# Patient Record
Sex: FEMALE | Race: BLACK OR AFRICAN AMERICAN | Employment: FULL TIME | URBAN - METROPOLITAN AREA
[De-identification: names, ages, dates, MRNs, and addresses within clinical notes are randomized per-mention and may not be internally consistent; named-entity substitution may affect disease eponyms.]

---

## 2017-01-16 ENCOUNTER — APPOINTMENT (OUTPATIENT)
Dept: GENERAL RADIOLOGY | Age: 37
End: 2017-01-16
Payer: MEDICAID

## 2017-01-16 ENCOUNTER — HOSPITAL ENCOUNTER (EMERGENCY)
Age: 37
Discharge: HOME | End: 2017-01-17
Attending: EMERGENCY MEDICINE
Payer: MEDICAID

## 2017-01-16 DIAGNOSIS — Z72.0 TOBACCO ABUSE: ICD-10-CM

## 2017-01-16 DIAGNOSIS — F12.10 MARIJUANA ABUSE: ICD-10-CM

## 2017-01-16 DIAGNOSIS — I10 ESSENTIAL HYPERTENSION: Primary | ICD-10-CM

## 2017-01-16 DIAGNOSIS — R42 LIGHTHEADEDNESS: ICD-10-CM

## 2017-01-16 LAB
ALBUMIN SERPL BCP-MCNC: 3.3 G/DL (ref 3.5–5)
ALBUMIN/GLOB SERPL: 0.8 {RATIO} (ref 1.2–3.5)
ALP SERPL-CCNC: 46 U/L (ref 50–136)
ALT SERPL-CCNC: 21 U/L (ref 12–65)
AMPHET UR QL SCN: NEGATIVE
ANION GAP BLD CALC-SCNC: 9 MMOL/L (ref 7–16)
AST SERPL W P-5'-P-CCNC: 48 U/L (ref 15–37)
BARBITURATES UR QL SCN: NEGATIVE
BASOPHILS # BLD AUTO: 0 K/UL (ref 0–0.2)
BASOPHILS # BLD: 0 % (ref 0–2)
BENZODIAZ UR QL: NEGATIVE
BILIRUB SERPL-MCNC: 0.3 MG/DL (ref 0.2–1.1)
BUN SERPL-MCNC: 11 MG/DL (ref 6–23)
CALCIUM SERPL-MCNC: 8.5 MG/DL (ref 8.3–10.4)
CANNABINOIDS UR QL SCN: POSITIVE
CHLORIDE SERPL-SCNC: 107 MMOL/L (ref 98–107)
CO2 SERPL-SCNC: 25 MMOL/L (ref 21–32)
COCAINE UR QL SCN: NEGATIVE
CREAT SERPL-MCNC: 0.88 MG/DL (ref 0.6–1)
DIFFERENTIAL METHOD BLD: NORMAL
EOSINOPHIL # BLD: 0.3 K/UL (ref 0–0.8)
EOSINOPHIL NFR BLD: 3 % (ref 0.5–7.8)
ERYTHROCYTE [DISTWIDTH] IN BLOOD BY AUTOMATED COUNT: 14 % (ref 11.9–14.6)
FLUAV AG NPH QL IA: NEGATIVE
FLUBV AG NPH QL IA: NEGATIVE
GLOBULIN SER CALC-MCNC: 3.9 G/DL (ref 2.3–3.5)
GLUCOSE SERPL-MCNC: 98 MG/DL (ref 65–100)
HCG UR QL: NEGATIVE
HCT VFR BLD AUTO: 39.5 % (ref 35.8–46.3)
HGB BLD-MCNC: 13 G/DL (ref 11.7–15.4)
IMM GRANULOCYTES # BLD: 0 K/UL (ref 0–0.5)
IMM GRANULOCYTES NFR BLD AUTO: 0.3 % (ref 0–5)
LYMPHOCYTES # BLD AUTO: 31 % (ref 13–44)
LYMPHOCYTES # BLD: 2.5 K/UL (ref 0.5–4.6)
MAGNESIUM SERPL-MCNC: 2 MG/DL (ref 1.8–2.4)
MCH RBC QN AUTO: 29.5 PG (ref 26.1–32.9)
MCHC RBC AUTO-ENTMCNC: 32.9 G/DL (ref 31.4–35)
MCV RBC AUTO: 89.6 FL (ref 79.6–97.8)
METHADONE UR QL: NEGATIVE
MONOCYTES # BLD: 0.3 K/UL (ref 0.1–1.3)
MONOCYTES NFR BLD AUTO: 4 % (ref 4–12)
NEUTS SEG # BLD: 4.9 K/UL (ref 1.7–8.2)
NEUTS SEG NFR BLD AUTO: 62 % (ref 43–78)
OPIATES UR QL: NEGATIVE
PCP UR QL: NEGATIVE
PLATELET # BLD AUTO: 247 K/UL (ref 150–450)
PMV BLD AUTO: 10.8 FL (ref 10.8–14.1)
POTASSIUM SERPL-SCNC: 3.9 MMOL/L (ref 3.5–5.1)
POTASSIUM SERPL-SCNC: 5.7 MMOL/L (ref 3.5–5.1)
PROT SERPL-MCNC: 7.2 G/DL (ref 6.3–8.2)
RBC # BLD AUTO: 4.41 M/UL (ref 4.05–5.25)
SODIUM SERPL-SCNC: 141 MMOL/L (ref 136–145)
TROPONIN I SERPL-MCNC: <0.02 NG/ML (ref 0.02–0.05)
TSH SERPL DL<=0.005 MIU/L-ACNC: 2.18 UIU/ML (ref 0.36–3.74)
WBC # BLD AUTO: 7.9 K/UL (ref 4.3–11.1)

## 2017-01-16 PROCEDURE — 84132 ASSAY OF SERUM POTASSIUM: CPT | Performed by: PHYSICIAN ASSISTANT

## 2017-01-16 PROCEDURE — 85379 FIBRIN DEGRADATION QUANT: CPT | Performed by: EMERGENCY MEDICINE

## 2017-01-16 PROCEDURE — 93005 ELECTROCARDIOGRAM TRACING: CPT | Performed by: PHYSICIAN ASSISTANT

## 2017-01-16 PROCEDURE — 84484 ASSAY OF TROPONIN QUANT: CPT | Performed by: PHYSICIAN ASSISTANT

## 2017-01-16 PROCEDURE — 99285 EMERGENCY DEPT VISIT HI MDM: CPT | Performed by: PHYSICIAN ASSISTANT

## 2017-01-16 PROCEDURE — 87804 INFLUENZA ASSAY W/OPTIC: CPT | Performed by: PHYSICIAN ASSISTANT

## 2017-01-16 PROCEDURE — 81025 URINE PREGNANCY TEST: CPT

## 2017-01-16 PROCEDURE — 80053 COMPREHEN METABOLIC PANEL: CPT | Performed by: EMERGENCY MEDICINE

## 2017-01-16 PROCEDURE — 81003 URINALYSIS AUTO W/O SCOPE: CPT | Performed by: PHYSICIAN ASSISTANT

## 2017-01-16 PROCEDURE — 96360 HYDRATION IV INFUSION INIT: CPT | Performed by: PHYSICIAN ASSISTANT

## 2017-01-16 PROCEDURE — 80307 DRUG TEST PRSMV CHEM ANLYZR: CPT | Performed by: PHYSICIAN ASSISTANT

## 2017-01-16 PROCEDURE — 74011250636 HC RX REV CODE- 250/636: Performed by: PHYSICIAN ASSISTANT

## 2017-01-16 PROCEDURE — 84443 ASSAY THYROID STIM HORMONE: CPT | Performed by: PHYSICIAN ASSISTANT

## 2017-01-16 PROCEDURE — 71020 XR CHEST PA LAT: CPT

## 2017-01-16 PROCEDURE — 85025 COMPLETE CBC W/AUTO DIFF WBC: CPT | Performed by: EMERGENCY MEDICINE

## 2017-01-16 PROCEDURE — 83735 ASSAY OF MAGNESIUM: CPT | Performed by: PHYSICIAN ASSISTANT

## 2017-01-16 RX ADMIN — SODIUM CHLORIDE 1000 ML: 900 INJECTION, SOLUTION INTRAVENOUS at 22:48

## 2017-01-16 NOTE — LETTER
[address] [facility] EMERGENCY DEPT [address] [facility] [place] [id number] 
[phone] Work/School Note Date: 1/16/2017 To Whom It May concern: 
 
Marvel Geller was seen and treated today in the emergency room by the following provider(s): 
Attending Provider: Suraj Keller MD 
Physician Assistant: HIRAL [place] Si. May return to work on Jan. 19, 2017 Marvel Geller Sincerely, Neelam Saldana RN

## 2017-01-16 NOTE — ED TRIAGE NOTES
Reports night sweats beginning at 3 am.  States nausea and \"foggy in my head. \"  States feels dizzy.

## 2017-01-17 ENCOUNTER — APPOINTMENT (OUTPATIENT)
Dept: CT IMAGING | Age: 37
End: 2017-01-17
Payer: MEDICAID

## 2017-01-17 VITALS
DIASTOLIC BLOOD PRESSURE: 94 MMHG | HEIGHT: 67 IN | WEIGHT: 197 LBS | HEART RATE: 71 BPM | TEMPERATURE: 98.3 F | RESPIRATION RATE: 16 BRPM | BODY MASS INDEX: 30.92 KG/M2 | SYSTOLIC BLOOD PRESSURE: 152 MMHG | OXYGEN SATURATION: 100 %

## 2017-01-17 LAB
ATRIAL RATE: 71 BPM
CALCULATED P AXIS, ECG09: 82 DEGREES
CALCULATED R AXIS, ECG10: 12 DEGREES
CALCULATED T AXIS, ECG11: 72 DEGREES
D DIMER PPP FEU-MCNC: 0.3 UG/ML(FEU)
DIAGNOSIS, 93000: NORMAL
DIASTOLIC BP, ECG02: NORMAL MMHG
P-R INTERVAL, ECG05: 142 MS
Q-T INTERVAL, ECG07: 392 MS
QRS DURATION, ECG06: 80 MS
QTC CALCULATION (BEZET), ECG08: 425 MS
SYSTOLIC BP, ECG01: NORMAL MMHG
TROPONIN I BLD-MCNC: 0 NG/ML (ref 0–0.08)
VENTRICULAR RATE, ECG03: 71 BPM

## 2017-01-17 PROCEDURE — 74011250637 HC RX REV CODE- 250/637

## 2017-01-17 PROCEDURE — 84484 ASSAY OF TROPONIN QUANT: CPT

## 2017-01-17 PROCEDURE — 70450 CT HEAD/BRAIN W/O DYE: CPT

## 2017-01-17 RX ORDER — LISINOPRIL 10 MG/1
10 TABLET ORAL DAILY
Qty: 30 TAB | Refills: 0 | OUTPATIENT
Start: 2017-01-17 | End: 2021-10-06

## 2017-01-17 RX ORDER — CLONIDINE HYDROCHLORIDE 0.1 MG/1
TABLET ORAL
Status: COMPLETED
Start: 2017-01-17 | End: 2017-01-17

## 2017-01-17 RX ORDER — CLONIDINE HYDROCHLORIDE 0.1 MG/1
0.1 TABLET ORAL
Status: COMPLETED | OUTPATIENT
Start: 2017-01-17 | End: 2017-01-17

## 2017-01-17 RX ADMIN — CLONIDINE HYDROCHLORIDE 0.1 MG: 0.1 TABLET ORAL at 01:18

## 2017-01-17 NOTE — ED PROVIDER NOTES
HPI Comments: 42-year-old -American female presents stating that since 3:00 AM today she has been nauseated with vomiting and diaphoresis. She states she has also had shortness of breath and chest tightness. She states the chest tightness started 3 hours ago and she feels tight to the left side of her neck as well. Patient's blood pressure is noted to be quite elevated at 165/117 and she states that she has no knowledge or treatment for hypertension. Patient states that she feels very lightheaded. Patient denies any sore throat or cough. She states she does smoke cigarettes. She denies any recreational drug abuse or EtOH. Family Medical history significant for mother with MI at age 39. Patient is a 31 y.o. female presenting with dizziness. The history is provided by the patient. Dizziness   This is a new problem. The current episode started 12 to 24 hours ago. The problem has been gradually worsening. There was no focality noted. Pertinent negatives include no focal weakness, no loss of sensation, no loss of balance, no slurred speech, no speech difficulty, no agitation, no mental status change, no unresponsiveness and no disorientation. There has been no fever. Associated symptoms include shortness of breath, chest pain, vomiting and nausea. Pertinent negatives include no altered mental status, no confusion, no headaches, no bowel incontinence and no bladder incontinence. Past Medical History:   Diagnosis Date    Ectopic pregnancy     Facial trauma     Other ill-defined conditions(479.89)      staph infection       Past Surgical History:   Procedure Laterality Date    Hx gyn       tubal preg--left ovary    Hx orthopaedic       left side orbital fx and nose surgery in Dec         No family history on file. Social History     Social History    Marital status: SINGLE     Spouse name: N/A    Number of children: N/A    Years of education: N/A     Occupational History    Not on file. Social History Main Topics    Smoking status: Current Every Day Smoker     Packs/day: 0.50    Smokeless tobacco: Never Used    Alcohol use Yes      Comment: occasionally    Drug use: No    Sexual activity: Not on file     Other Topics Concern    Not on file     Social History Narrative         ALLERGIES: Review of patient's allergies indicates no known allergies. Review of Systems   Constitutional: Positive for diaphoresis. HENT: Negative. Respiratory: Positive for shortness of breath. Cardiovascular: Positive for chest pain. Negative for palpitations. Gastrointestinal: Positive for nausea and vomiting. Negative for abdominal distention, abdominal pain, bowel incontinence, constipation and diarrhea. Genitourinary: Negative. Negative for bladder incontinence. Musculoskeletal: Negative. Neurological: Positive for dizziness. Negative for focal weakness, speech difficulty, headaches and loss of balance. Psychiatric/Behavioral: Negative for agitation and confusion. Vitals:    01/16/17 1850   BP: (!) 165/117   Pulse: 80   Resp: 16   Temp: 98.3 °F (36.8 °C)   SpO2: 100%   Weight: 89.4 kg (197 lb)   Height: 5' 7\" (1.702 m)            Physical Exam   Constitutional: She is oriented to person, place, and time. Vital signs are normal. She appears well-developed and well-nourished. She is active. Non-toxic appearance. She does not appear ill. No distress. Patient is sitting on exam stretcher and is ambulatory and moving about and exam area without difficulty. HENT:   Head: Normocephalic and atraumatic. Right Ear: Tympanic membrane normal.   Left Ear: Tympanic membrane normal.   Nose: Nose normal.   Mouth/Throat: Uvula is midline, oropharynx is clear and moist and mucous membranes are normal.   Eyes: Conjunctivae and EOM are normal. Pupils are equal, round, and reactive to light. No scleral icterus. Neck: Normal range of motion and full passive range of motion without pain.  Neck supple. No muscular tenderness present. No Brudzinski's sign and no Kernig's sign noted. No thyromegaly present. Cardiovascular: Normal rate, regular rhythm and normal heart sounds. Exam reveals no gallop and no friction rub. No murmur heard. Pulmonary/Chest: Effort normal and breath sounds normal. She has no decreased breath sounds. She has no wheezes. She has no rhonchi. She has no rales. Abdominal: Soft. Normal appearance, normal aorta and bowel sounds are normal. She exhibits no distension, no abdominal bruit and no mass. There is no hepatosplenomegaly. There is no tenderness. There is no rebound, no guarding and no CVA tenderness. Musculoskeletal: Normal range of motion. She exhibits no edema or tenderness. Lymphadenopathy:     She has no cervical adenopathy. Neurological: She is alert and oriented to person, place, and time. She has normal strength and normal reflexes. No cranial nerve deficit or sensory deficit. Coordination and gait normal.   No focal neurological deficits identified    Skin: Skin is warm, dry and intact. No cyanosis. Nails show no clubbing. Psychiatric: She has a normal mood and affect. Her speech is normal and behavior is normal.   Nursing note and vitals reviewed. MDM  Number of Diagnoses or Management Options  Essential hypertension: new and requires workup  Lightheadedness: new and requires workup  Marijuana abuse: new and does not require workup  Tobacco abuse: new and does not require workup  Diagnosis management comments: Patient is reevaluated and states she is feeling better following IV fluids. I discussed with her the use of recreational marijuana. I discussed that there may be other substances mixed in with the marijuana which could cause her to have the sensations and symptoms that she was experiencing.        Amount and/or Complexity of Data Reviewed  Clinical lab tests: ordered and reviewed  Tests in the radiology section of CPT®: ordered and reviewed    Risk of Complications, Morbidity, and/or Mortality  Presenting problems: moderate  Diagnostic procedures: low    Patient Progress  Patient progress: improved    ED Course       Procedures    Recent Results (from the past 192 hour(s))   CBC WITH AUTOMATED DIFF    Collection Time: 01/16/17  6:52 PM   Result Value Ref Range    WBC 7.9 4.3 - 11.1 K/uL    RBC 4.41 4.05 - 5.25 M/uL    HGB 13.0 11.7 - 15.4 g/dL    HCT 39.5 35.8 - 46.3 %    MCV 89.6 79.6 - 97.8 FL    MCH 29.5 26.1 - 32.9 PG    MCHC 32.9 31.4 - 35.0 g/dL    RDW 14.0 11.9 - 14.6 %    PLATELET 723 471 - 291 K/uL    MPV 10.8 10.8 - 14.1 FL    DF AUTOMATED      NEUTROPHILS 62 43 - 78 %    LYMPHOCYTES 31 13 - 44 %    MONOCYTES 4 4.0 - 12.0 %    EOSINOPHILS 3 0.5 - 7.8 %    BASOPHILS 0 0.0 - 2.0 %    IMMATURE GRANULOCYTES 0.3 0.0 - 5.0 %    ABS. NEUTROPHILS 4.9 1.7 - 8.2 K/UL    ABS. LYMPHOCYTES 2.5 0.5 - 4.6 K/UL    ABS. MONOCYTES 0.3 0.1 - 1.3 K/UL    ABS. EOSINOPHILS 0.3 0.0 - 0.8 K/UL    ABS. BASOPHILS 0.0 0.0 - 0.2 K/UL    ABS. IMM. GRANS. 0.0 0.0 - 0.5 K/UL   METABOLIC PANEL, COMPREHENSIVE    Collection Time: 01/16/17  6:52 PM   Result Value Ref Range    Sodium 141 136 - 145 mmol/L    Potassium 5.7 (H) 3.5 - 5.1 mmol/L    Chloride 107 98 - 107 mmol/L    CO2 25 21 - 32 mmol/L    Anion gap 9 7 - 16 mmol/L    Glucose 98 65 - 100 mg/dL    BUN 11 6 - 23 MG/DL    Creatinine 0.88 0.6 - 1.0 MG/DL    GFR est AA >60 >60 ml/min/1.73m2    GFR est non-AA >60 >60 ml/min/1.73m2    Calcium 8.5 8.3 - 10.4 MG/DL    Bilirubin, total 0.3 0.2 - 1.1 MG/DL    ALT 21 12 - 65 U/L    AST 48 (H) 15 - 37 U/L    Alk.  phosphatase 46 (L) 50 - 136 U/L    Protein, total 7.2 6.3 - 8.2 g/dL    Albumin 3.3 (L) 3.5 - 5.0 g/dL    Globulin 3.9 (H) 2.3 - 3.5 g/dL    A-G Ratio 0.8 (L) 1.2 - 3.5     DRUG SCREEN, URINE    Collection Time: 01/16/17  8:00 PM   Result Value Ref Range    PCP(PHENCYCLIDINE) NEGATIVE       BENZODIAZEPINE NEGATIVE       COCAINE NEGATIVE       AMPHETAMINE NEGATIVE       METHADONE NEGATIVE       THC (TH-CANNABINOL) POSITIVE      OPIATES NEGATIVE       BARBITURATES NEGATIVE      HCG URINE, QL. - POC    Collection Time: 01/16/17  8:59 PM   Result Value Ref Range    Pregnancy test,urine (POC) NEGATIVE  NEG     EKG, 12 LEAD, INITIAL    Collection Time: 01/16/17  9:10 PM   Result Value Ref Range    Systolic BP  mmHg    Diastolic BP  mmHg    Ventricular Rate 71 BPM    Atrial Rate 71 BPM    P-R Interval 142 ms    QRS Duration 80 ms    Q-T Interval 392 ms    QTC Calculation (Bezet) 425 ms    Calculated P Axis 82 degrees    Calculated R Axis 12 degrees    Calculated T Axis 72 degrees    Diagnosis       Normal sinus rhythm  Indeterminate axis  Borderline ECG  Confirmed by Alice Conklin MD (), ARCADIO PROCTOR ([id number]) on 1/17/2017 6:52:10 AM     TROPONIN I    Collection Time: 01/16/17  9:15 PM   Result Value Ref Range    Troponin-I, Qt. <0.02 (L) 0.02 - 0.05 NG/ML   TSH 3RD GENERATION    Collection Time: 01/16/17  9:15 PM   Result Value Ref Range    TSH 2.180 0.358 - 3.740 uIU/mL   MAGNESIUM    Collection Time: 01/16/17  9:15 PM   Result Value Ref Range    Magnesium 2.0 1.8 - 2.4 mg/dL   POTASSIUM    Collection Time: 01/16/17  9:15 PM   Result Value Ref Range    Potassium 3.9 3.5 - 5.1 mmol/L   INFLUENZA A & B AG (RAPID TEST)    Collection Time: 01/16/17  9:25 PM   Result Value Ref Range    Influenza A Ag NEGATIVE  NEG      Influenza B Ag NEGATIVE  NEG     D DIMER    Collection Time: 01/16/17 11:50 PM   Result Value Ref Range    D DIMER 0.30 <0.55 ug/ml(FEU)   POC TROPONIN-I    Collection Time: 01/17/17 12:32 AM   Result Value Ref Range    Troponin-I (POC) 0 0.0 - 0.08 ng/ml     CT HEAD WO CONT   Final Result   IMPRESSION:      Negative noncontrast brain CT. No acute intracranial abnormality. XR CHEST PA LAT   Final Result   IMPRESSION: Cardiac silhouette somewhat enlarged.          I discussed the results of all labs, procedures, radiographs, and treatments with the patient and available family. Treatment plan is agreed upon and the patient is ready for discharge. Questions about treatment in the ED and differential diagnosis of presenting condition were answered. Patient was given verbal discharge instructions including, but not limited to, importance of returning to the emergency department for any concern of worsening or continued symptoms. Instructions were given to follow up with a primary care provider or specialist within 1-2 days. Adverse effects of medications, if prescribed, were discussed and patient was advised to refrain from significant physical activity until followed up by primary care physician and to not drive or operate heavy machinery after taking any sedating substances.

## 2017-01-17 NOTE — DISCHARGE INSTRUCTIONS
Your blood pressure has been elevated in the ER [facility] and it is important that you have this followed by a primary care provider. You have been provided with a list.           DASH Diet: Care Instructions  Your Care Instructions  The DASH diet is an eating plan that can help lower your blood pressure. DASH stands for Dietary Approaches to Stop Hypertension. Hypertension is high blood pressure. The DASH diet focuses on eating foods that are high in calcium, potassium, and magnesium. These nutrients can lower blood pressure. The foods that are highest in these nutrients are fruits, vegetables, low-fat dairy products, nuts, seeds, and legumes. But taking calcium, potassium, and magnesium supplements instead of eating foods that are high in those nutrients does not have the same effect. The DASH diet also includes whole grains, fish, and poultry. The DASH diet is one of several lifestyle changes your doctor may recommend to lower your high blood pressure. Your doctor may also want you to decrease the amount of sodium in your diet. Lowering sodium while following the DASH diet can lower blood pressure even further than just the DASH diet alone. Follow-up care is a key part of your treatment and safety. Be sure to make and go to all appointments, and call your doctor if you are having problems. It's also a good idea to know your test results and keep a list of the medicines you take. How can you care for yourself at home? Following the DASH diet  · Eat 4 to 5 servings of fruit each day. A serving is 1 medium-sized piece of fruit, ½ cup chopped or canned fruit, 1/4 cup dried fruit, or 4 ounces (½ cup) of fruit juice. Choose fruit more often than fruit juice. · Eat 4 to 5 servings of vegetables each day. A serving is 1 cup of lettuce or raw leafy vegetables, ½ cup of chopped or cooked vegetables, or 4 ounces (½ cup) of vegetable juice. Choose vegetables more often than vegetable juice.   · Get 2 to 3 servings of low-fat and fat-free dairy each day. A serving is 8 ounces of milk, 1 cup of yogurt, or 1 ½ ounces of cheese. · Eat 6 to 8 servings of grains each day. A serving is 1 slice of bread, 1 ounce of dry cereal, or ½ cup of cooked rice, pasta, or cooked cereal. Try to choose whole-grain products as much as possible. · Limit lean meat, poultry, and fish to 2 servings each day. A serving is 3 ounces, about the size of a deck of cards. · Eat 4 to 5 servings of nuts, seeds, and legumes (cooked dried beans, lentils, and split peas) each week. A serving is 1/3 cup of nuts, 2 tablespoons of seeds, or ½ cup of cooked beans or peas. · Limit fats and oils to 2 to 3 servings each day. A serving is 1 teaspoon of vegetable oil or 2 tablespoons of salad dressing. · Limit sweets and added sugars to 5 servings or less a week. A serving is 1 tablespoon jelly or jam, ½ cup sorbet, or 1 cup of lemonade. · Eat less than 2,300 milligrams (mg) of sodium a day. If you limit your sodium to 1,500 mg a day, you can lower your blood pressure even more. Tips for success  · Start small. Do not try to make dramatic changes to your diet all at once. You might feel that you are missing out on your favorite foods and then be more likely to not follow the plan. Make small changes, and stick with them. Once those changes become habit, add a few more changes. · Try some of the following:  ¨ Make it a goal to eat a fruit or vegetable at every meal and at snacks. This will make it easy to get the recommended amount of fruits and vegetables each day. ¨ Try yogurt topped with fruit and nuts for a snack or healthy dessert. ¨ Add lettuce, tomato, cucumber, and onion to sandwiches. ¨ Combine a ready-made pizza crust with low-fat mozzarella cheese and lots of vegetable toppings. Try using tomatoes, squash, spinach, broccoli, carrots, cauliflower, and onions.   ¨ Have a variety of cut-up vegetables with a low-fat dip as an appetizer instead of chips and dip.  ¨ Sprinkle sunflower seeds or chopped almonds over salads. Or try adding chopped walnuts or almonds to cooked vegetables. ¨ Try some vegetarian meals using beans and peas. Add garbanzo or kidney beans to salads. Make burritos and tacos with mashed grace beans or black beans. Where can you learn more? Go to http://bekah-bob.info/. Enter V707 in the search box to learn more about \"DASH Diet: Care Instructions. \"  Current as of: March 23, 2016  Content Version: 11.1  © 6623-9107 [employer]. Care instructions adapted under license by [employer] (which disclaims liability or warranty for this information). If you have questions about a medical condition or this instruction, always ask your healthcare professional. [address] any warranty or liability for your use of this information. Dizziness: Care Instructions  Your Care Instructions  Dizziness is the feeling of unsteadiness or fuzziness in your head. It is different than having vertigo, which is a feeling that the room is spinning or that you are moving or falling. It is also different from lightheadedness, which is the feeling that you are about to faint. It can be hard to know what causes dizziness. Some people feel dizzy when they have migraine headaches. Sometimes bouts of flu can make you feel dizzy. Some medical conditions, such as heart problems or high blood pressure, can make you feel dizzy. Many medicines can cause dizziness, including medicines for high blood pressure, pain, or anxiety. If a medicine causes your symptoms, your doctor may recommend that you stop or change the medicine. If it is a problem with your heart, you may need medicine to help your heart work better. If there is no clear reason for your symptoms, your doctor may suggest watching and waiting for a while to see if the dizziness goes away on its own.   Follow-up care is a key part of your treatment and safety. Be sure to make and go to all appointments, and call your doctor if you are having problems. It's also a good idea to know your test results and keep a list of the medicines you take. How can you care for yourself at home? · If your doctor recommends or prescribes medicine, take it exactly as directed. Call your doctor if you think you are having a problem with your medicine. · Do not drive while you feel dizzy. · Try to prevent falls. Steps you can take include:  ¨ Using nonskid mats, adding grab bars near the tub, and using night-lights. ¨ Clearing your home so that walkways are free of anything you might trip on. ¨ Letting family and friends know that you have been feeling dizzy. This will help them know how to help you. When should you call for help? Call 911 anytime you think you may need emergency care. For example, call if:  · You passed out (lost consciousness). · You have dizziness along with symptoms of a heart attack. These may include:  ¨ Chest pain or pressure, or a strange feeling in the chest.  ¨ Sweating. ¨ Shortness of breath. ¨ Nausea or vomiting. ¨ Pain, pressure, or a strange feeling in the back, neck, jaw, or upper belly or in one or both shoulders or arms. ¨ Lightheadedness or sudden weakness. ¨ A fast or irregular heartbeat. · You have symptoms of a stroke. These may include:  ¨ Sudden numbness, tingling, weakness, or loss of movement in your face, arm, or leg, especially on only one side of your body. ¨ Sudden vision changes. ¨ Sudden trouble speaking. ¨ Sudden confusion or trouble understanding simple statements. ¨ Sudden problems with walking or balance. ¨ A sudden, severe headache that is different from past headaches. Call your doctor now or seek immediate medical care if:  · You feel dizzy and have a fever, headache, or ringing in your ears. · You have new or increased nausea and vomiting. · Your dizziness does not go away or comes back.   Watch closely for changes in your health, and be sure to contact your doctor if:  · You do not get better as expected. Where can you learn more? Go to http://bekah-bob.info/. Enter I597 in the search box to learn more about \"Dizziness: Care Instructions. \"  Current as of: May 27, 2016  Content Version: 11.1  © 2073-4047 [employer]. Care instructions adapted under license by [employer] (which disclaims liability or warranty for this information). If you have questions about a medical condition or this instruction, always ask your healthcare professional. [address] any warranty or liability for your use of this information. Learning About High Blood Pressure  What is high blood pressure? Blood pressure is a measure of how hard the blood pushes against the walls of your arteries. It's normal for blood pressure to go up and down throughout the day, but if it stays up, you have high blood pressure. Another name for high blood pressure is hypertension. Two numbers tell you your blood pressure. The first number is the systolic pressure. It shows how hard the blood pushes when your heart is pumping. The second number is the diastolic pressure. It shows how hard the blood pushes between heartbeats, when your heart is relaxed and filling with blood. A blood pressure of less than 120/80 (say \"120 over 80\") is ideal for an adult. High blood pressure is 140/90 or higher. You have high blood pressure if your top number is 140 or higher or your bottom number is 90 or higher, or both. Many people fall into the category in between, called prehypertension. People with prehypertension need to make lifestyle changes to bring their blood pressure down and help prevent or delay high blood pressure. What happens when you have high blood pressure? · Blood flows through your arteries with too much force. Over time, this damages the walls of your arteries.  But you can't feel it. High blood pressure usually doesn't cause symptoms. · Fat and calcium start to build up in your arteries. This buildup is called plaque. Plaque makes your arteries narrower and stiffer. Blood can't flow through them as easily. · This lack of good blood flow starts to damage some of the organs in your body. This can lead to problems such as coronary artery disease and heart attack, heart failure, stroke, kidney failure, and eye damage. How can you prevent high blood pressure? · Stay at a healthy weight. · Try to limit how much sodium you eat to less than 2,300 milligrams (mg) a day. If you limit your sodium to 1,500 mg a day, you can lower your blood pressure even more. ¨ Buy foods that are labeled \"unsalted,\" \"sodium-free,\" or \"low-sodium. \" Foods labeled \"reduced-sodium\" and \"light sodium\" may still have too much sodium. ¨ Flavor your food with garlic, lemon juice, onion, vinegar, herbs, and spices instead of salt. Do not use soy sauce, steak sauce, onion salt, garlic salt, mustard, or ketchup on your food. ¨ Use less salt (or none) when recipes call for it. You can often use half the salt a recipe calls for without losing flavor. · Be physically active. Get at least 30 minutes of exercise on most days of the week. Walking is a good choice. You also may want to do other activities, such as running, swimming, cycling, or playing tennis or team sports. · Limit alcohol to 2 drinks a day for men and 1 drink a day for women. · Eat plenty of fruits, vegetables, and low-fat dairy products. Eat less saturated and total fats. How is high blood pressure treated? · Your doctor will suggest making lifestyle changes. For example, your doctor may ask you to eat healthy foods, quit smoking, lose extra weight, and be more active. · If lifestyle changes don't help enough or your blood pressure is very high, you will have to take medicine every day.   Follow-up care is a key part of your treatment and safety. Be sure to make and go to all appointments, and call your doctor if you are having problems. It's also a good idea to know your test results and keep a list of the medicines you take. Where can you learn more? Go to http://bekah-bob.info/. Enter P501 in the search box to learn more about \"Learning About High Blood Pressure. \"  Current as of: March 23, 2016  Content Version: 11.1  © 8889-0840 [employer]. Care instructions adapted under license by [employer] (which disclaims liability or warranty for this information). If you have questions about a medical condition or this instruction, always ask your healthcare professional. [address] any warranty or liability for your use of this information. Lightheadedness or Faintness: Care Instructions  Your Care Instructions  Lightheadedness is a feeling that you are about to faint or \"pass out. \" You do not feel as if you or your surroundings are moving. It is different from vertigo, which is the feeling that you or things around you are spinning or tilting. Lightheadedness usually goes away or gets better when you lie down. If lightheadedness gets worse, it can lead to a fainting spell. It is common to feel lightheaded from time to time. Lightheadedness usually is not caused by a serious problem. It often is caused by a short-lasting drop in blood pressure and blood flow to your head that occurs when you get up too quickly from a seated or lying position. Follow-up care is a key part of your treatment and safety. Be sure to make and go to all appointments, and call your doctor if you are having problems. It's also a good idea to know your test results and keep a list of the medicines you take. How can you care for yourself at home? · Lie down for 1 or 2 minutes when you feel lightheaded.  After lying down, sit up slowly and remain sitting for 1 to 2 minutes before slowly standing up.  · Avoid movements, positions, or activities that have made you lightheaded in the past.  · Get plenty of rest, especially if you have a cold or flu, which can cause lightheadedness. · Make sure you drink plenty of fluids, especially if you have a fever or have been sweating. · Do not drive or put yourself and others in danger while you feel lightheaded. When should you call for help? Call 911 anytime you think you may need emergency care. For example, call if:  · You have symptoms of a stroke. These may include:  ¨ Sudden numbness, tingling, weakness, or loss of movement in your face, arm, or leg, especially on only one side of your body. ¨ Sudden vision changes. ¨ Sudden trouble speaking. ¨ Sudden confusion or trouble understanding simple statements. ¨ Sudden problems with walking or balance. ¨ A sudden, severe headache that is different from past headaches. · You have symptoms of a heart attack. These may include:  ¨ Chest pain or pressure, or a strange feeling in the chest.  ¨ Sweating. ¨ Shortness of breath. ¨ Nausea or vomiting. ¨ Pain, pressure, or a strange feeling in the back, neck, jaw, or upper belly or in one or both shoulders or arms. ¨ Lightheadedness or sudden weakness. ¨ A fast or irregular heartbeat. After you call 911, the  may tell you to chew 1 adult-strength or 2 to 4 low-dose aspirin. Wait for an ambulance. Do not try to drive yourself. Watch closely for changes in your health, and be sure to contact your doctor if:  · Your lightheadedness gets worse or does not get better with home care. Where can you learn more? Go to http://bekah-bob.info/. Enter K577 in the search box to learn more about \"Lightheadedness or Faintness: Care Instructions. \"  Current as of: May 27, 2016  Content Version: 11.1  © 2695-7950 [employer].  Care instructions adapted under license by [employer] (which disclaims liability or warranty for this information). If you have questions about a medical condition or this instruction, always ask your healthcare professional. [address] any warranty or liability for your use of this information. Learning About Benefits From Quitting Smoking  How does quitting smoking make you healthier? If you're thinking about quitting smoking, you may have a few reasons to be smoke-free. Your health may be one of them. · When you quit smoking, you lower your risks for cancer, lung disease, heart attack, stroke, blood vessel disease, and blindness from macular degeneration. · When you're smoke-free, you get sick less often, and you heal faster. You are less likely to get colds, flu, bronchitis, and pneumonia. · As a nonsmoker, you may find that your mood is better and you are less stressed. When and how will you feel healthier? Quitting has real health benefits that start from day 1 of being smoke-free. And the longer you stay smoke-free, the healthier you get and the better you feel. The first hours  · After just 20 minutes, your blood pressure and heart rate go down. That means there's less stress on your heart and blood vessels. · Within 12 hours, the level of carbon monoxide in your blood drops back to normal. That makes room for more oxygen. With more oxygen in your body, you may notice that you have more energy than when you smoked. After 2 weeks  · Your lungs start to work better. · Your risk of heart attack starts to drop. After 1 month  · When your lungs are clear, you cough less and breathe deeper, so it's easier to be active. · Your sense of taste and smell return. That means you can enjoy food more than you have since you started smoking. Over the years  · After 1 year, your risk of heart disease is half what it would be if you kept smoking. · After 5 years, your risk of stroke starts to shrink.  Within a few years after that, it's about the same as if you'd never smoked. · After 10 years, your risk of dying from lung cancer is cut by about half. And your risk for many other types of cancer is lower too. How would quitting help others in your life? When you quit smoking, you improve the health of everyone who now breathes in your smoke. · Their heart, lung, and cancer risks drop, much like yours. · They are sick less. For babies and small children, living smoke-free means they're less likely to have ear infections, pneumonia, and bronchitis. · If you're a woman who is or will be pregnant someday, quitting smoking means a healthier . · Children who are close to you are less likely to become adult smokers. Where can you learn more? Go to http://bekah-bob.info/. Enter [id number] [id number] in the search box to learn more about \"Learning About Benefits From Quitting Smoking. \"  Current as of: May 26, 2016  Content Version: 11.1  © 4482-8092 [employer]. Care instructions adapted under license by [employer] (which disclaims liability or warranty for this information). If you have questions about a medical condition or this instruction, always ask your healthcare professional. [place] [address] any warranty or liability for your use of this information. Marijuana Use in Teens: Care Instructions  Your Care Instructions  During your exam, traces of marijuana were found in your body. The active chemical in marijuana is THC. THC usually can be found in urine for a few days after marijuana is used. If the use is heavy, THC may be found for weeks after the use has stopped. In the [place], marijuana laws vary widely. The drug is legal in some states for those over age 24. So its use by teens is illegal. And marijuana possession is still a crime under federal law. Most schools and employers have strict rules about drug use. Many do drug testing.  A positive drug test might cause you to be expelled from school or keep you from getting into a school you want to attend. You might not be able to take part in school sports or clubs. Or it might cause you to lose a job or keep you from getting hired. Follow-up care is a key part of your treatment and safety. Be sure to make and go to all appointments, and call your doctor if you are having problems. It's also a good idea to know your test results and keep a list of the medicines you take. How can you care for yourself at home? · Think carefully about whether using marijuana is worth the risks. · Do not drive or operate heavy equipment while using marijuana. Using marijuana may affect your judgment and coordination. And it can increase your risk of being in a car crash. · Make sure you understand the laws in your area. · Know the policies of your school or your employer. When should you call for help? Watch closely for changes in your health, and contact your doctor if you think you have a problem with marijuana use. Where can you learn more? Go to http://bekah-obb.info/. Enter P293 in the search box to learn more about \"Marijuana Use in Teens: Care Instructions. \"  Current as of: February 24, 2016  Content Version: 11.1  © 6750-9462 [employer]. Care instructions adapted under license by [employer] (which disclaims liability or warranty for this information). If you have questions about a medical condition or this instruction, always ask your healthcare professional. [place] [address] any warranty or liability for your use of this information.

## 2018-02-23 ENCOUNTER — HOSPITAL ENCOUNTER (EMERGENCY)
Age: 38
Discharge: HOME | End: 2018-02-23
Attending: EMERGENCY MEDICINE
Payer: SELF-PAY

## 2018-02-23 VITALS
OXYGEN SATURATION: 98 % | RESPIRATION RATE: 24 BRPM | HEIGHT: 67 IN | TEMPERATURE: 98.6 F | SYSTOLIC BLOOD PRESSURE: 145 MMHG | DIASTOLIC BLOOD PRESSURE: 108 MMHG | BODY MASS INDEX: 29.82 KG/M2 | HEART RATE: 101 BPM | WEIGHT: 190 LBS

## 2018-02-23 DIAGNOSIS — J10.1 INFLUENZA B: Primary | ICD-10-CM

## 2018-02-23 LAB
FLUAV AG NPH QL IA: NEGATIVE
FLUBV AG NPH QL IA: POSITIVE

## 2018-02-23 PROCEDURE — 99283 EMERGENCY DEPT VISIT LOW MDM: CPT | Performed by: PHYSICIAN ASSISTANT

## 2018-02-23 PROCEDURE — 99282 EMERGENCY DEPT VISIT SF MDM: CPT | Performed by: PHYSICIAN ASSISTANT

## 2018-02-23 PROCEDURE — 87804 INFLUENZA ASSAY W/OPTIC: CPT | Performed by: EMERGENCY MEDICINE

## 2018-02-23 PROCEDURE — 74011250637 HC RX REV CODE- 250/637: Performed by: PHYSICIAN ASSISTANT

## 2018-02-23 RX ORDER — OSELTAMIVIR PHOSPHATE 75 MG/1
75 CAPSULE ORAL 2 TIMES DAILY
Qty: 10 CAP | Refills: 0 | Status: SHIPPED | OUTPATIENT
Start: 2018-02-23 | End: 2018-02-23

## 2018-02-23 RX ORDER — ONDANSETRON 4 MG/1
4 TABLET, ORALLY DISINTEGRATING ORAL
Status: COMPLETED | OUTPATIENT
Start: 2018-02-23 | End: 2018-02-23

## 2018-02-23 RX ADMIN — ONDANSETRON 4 MG: 4 TABLET, ORALLY DISINTEGRATING ORAL at 09:29
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
## 2018-02-23 NOTE — DISCHARGE INSTRUCTIONS

## 2018-02-23 NOTE — ED TRIAGE NOTES
Pt states for the past two days she has been hurting in her back, sides, and her head. Pt states it all started with a non productive cough. Pt states having night sweats and vomiting.

## 2018-02-23 NOTE — LETTER
[facility] EMERGENCY DEPT [address] [id number] [phone] Work/School Note Date: 2/23/2018 To Whom It May concern: 
 
Sonu Ding was seen and treated today in the emergency room by the following provider(s): 
Attending Provider: Anai Juan MD 
Physician Assistant: Rosalea Baumgarten, PA. Sonu Ding may return to work on 02/28/18. Sincerely, Rosalea Baumgarten, PA

## 2018-03-02 ENCOUNTER — HOSPITAL ENCOUNTER (EMERGENCY)
Age: 38
Discharge: HOME | End: 2018-03-02
Attending: EMERGENCY MEDICINE
Payer: SELF-PAY

## 2018-03-02 ENCOUNTER — APPOINTMENT (OUTPATIENT)
Dept: GENERAL RADIOLOGY | Age: 38
End: 2018-03-02
Attending: EMERGENCY MEDICINE
Payer: SELF-PAY

## 2018-03-02 VITALS
HEART RATE: 89 BPM | DIASTOLIC BLOOD PRESSURE: 92 MMHG | WEIGHT: 190 LBS | TEMPERATURE: 98 F | OXYGEN SATURATION: 100 % | SYSTOLIC BLOOD PRESSURE: 132 MMHG | BODY MASS INDEX: 29.82 KG/M2 | HEIGHT: 67 IN | RESPIRATION RATE: 18 BRPM

## 2018-03-02 DIAGNOSIS — R05.9 COUGH: Primary | ICD-10-CM

## 2018-03-02 LAB
ALBUMIN SERPL-MCNC: 3.5 G/DL (ref 3.5–5)
ALBUMIN/GLOB SERPL: 0.9 {RATIO} (ref 1.2–3.5)
ALP SERPL-CCNC: 46 U/L (ref 50–136)
ALT SERPL-CCNC: 17 U/L (ref 12–65)
ANION GAP SERPL CALC-SCNC: 11 MMOL/L (ref 7–16)
AST SERPL-CCNC: 12 U/L (ref 15–37)
BASOPHILS # BLD: 0 K/UL (ref 0–0.2)
BASOPHILS NFR BLD: 0 % (ref 0–2)
BILIRUB SERPL-MCNC: 0.4 MG/DL (ref 0.2–1.1)
BUN SERPL-MCNC: 18 MG/DL (ref 6–23)
CALCIUM SERPL-MCNC: 8.1 MG/DL (ref 8.3–10.4)
CHLORIDE SERPL-SCNC: 109 MMOL/L (ref 98–107)
CO2 SERPL-SCNC: 24 MMOL/L (ref 21–32)
CREAT SERPL-MCNC: 0.77 MG/DL (ref 0.6–1)
DIFFERENTIAL METHOD BLD: ABNORMAL
EOSINOPHIL # BLD: 0.2 K/UL (ref 0–0.8)
EOSINOPHIL NFR BLD: 3 % (ref 0.5–7.8)
ERYTHROCYTE [DISTWIDTH] IN BLOOD BY AUTOMATED COUNT: 13 % (ref 11.9–14.6)
GLOBULIN SER CALC-MCNC: 3.9 G/DL (ref 2.3–3.5)
GLUCOSE SERPL-MCNC: 98 MG/DL (ref 65–100)
HCT VFR BLD AUTO: 43.8 % (ref 35.8–46.3)
HGB BLD-MCNC: 14.8 G/DL (ref 11.7–15.4)
IMM GRANULOCYTES # BLD: 0 K/UL (ref 0–0.5)
IMM GRANULOCYTES NFR BLD AUTO: 0 % (ref 0–5)
LYMPHOCYTES # BLD: 3.1 K/UL (ref 0.5–4.6)
LYMPHOCYTES NFR BLD: 48 % (ref 13–44)
MCH RBC QN AUTO: 30.2 PG (ref 26.1–32.9)
MCHC RBC AUTO-ENTMCNC: 33.8 G/DL (ref 31.4–35)
MCV RBC AUTO: 89.4 FL (ref 79.6–97.8)
MONOCYTES # BLD: 0.1 K/UL (ref 0.1–1.3)
MONOCYTES NFR BLD: 1 % (ref 4–12)
NEUTS SEG # BLD: 3 K/UL (ref 1.7–8.2)
NEUTS SEG NFR BLD: 48 % (ref 43–78)
PLATELET # BLD AUTO: 198 K/UL (ref 150–450)
PMV BLD AUTO: 11.5 FL (ref 10.8–14.1)
POTASSIUM SERPL-SCNC: 3.9 MMOL/L (ref 3.5–5.1)
PROT SERPL-MCNC: 7.4 G/DL (ref 6.3–8.2)
RBC # BLD AUTO: 4.9 M/UL (ref 4.05–5.25)
SODIUM SERPL-SCNC: 144 MMOL/L (ref 136–145)
WBC # BLD AUTO: 6.4 K/UL (ref 4.3–11.1)

## 2018-03-02 PROCEDURE — 71046 X-RAY EXAM CHEST 2 VIEWS: CPT

## 2018-03-02 PROCEDURE — 80053 COMPREHEN METABOLIC PANEL: CPT | Performed by: PHYSICIAN ASSISTANT

## 2018-03-02 PROCEDURE — 99283 EMERGENCY DEPT VISIT LOW MDM: CPT | Performed by: PHYSICIAN ASSISTANT

## 2018-03-02 PROCEDURE — 85025 COMPLETE CBC W/AUTO DIFF WBC: CPT | Performed by: PHYSICIAN ASSISTANT
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
## 2018-03-02 NOTE — DISCHARGE INSTRUCTIONS

## 2018-03-02 NOTE — ED NOTES
I have reviewed discharge instructions with the patient. The patient verbalized understanding. Patient left ED via Discharge Method: ambulatory to Home with (self). Opportunity for questions and clarification provided. Patient given 0 scripts. To continue your aftercare when you leave the hospital, you may receive an automated call from our care team to check in on how you are doing. This is a free service and part of our promise to provide the best care and service to meet your aftercare needs.  If you have questions, or wish to unsubscribe from this service please call [phone]. Thank you for Choosing our [facility] Emergency Department.

## 2018-03-02 NOTE — ED TRIAGE NOTES
Patient reports hx of flu diagnosis around 1 week ago. Patient states smelling/tasting very fruity odor. Some congestion still present.

## 2021-10-06 ENCOUNTER — HOSPITAL ENCOUNTER (EMERGENCY)
Age: 41
Discharge: HOME | End: 2021-10-06
Attending: EMERGENCY MEDICINE

## 2021-10-06 ENCOUNTER — APPOINTMENT (OUTPATIENT)
Dept: GENERAL RADIOLOGY | Age: 41
End: 2021-10-06
Attending: EMERGENCY MEDICINE

## 2021-10-06 VITALS
HEART RATE: 83 BPM | DIASTOLIC BLOOD PRESSURE: 111 MMHG | BODY MASS INDEX: 32.18 KG/M2 | WEIGHT: 205 LBS | HEIGHT: 67 IN | OXYGEN SATURATION: 99 % | SYSTOLIC BLOOD PRESSURE: 184 MMHG | RESPIRATION RATE: 16 BRPM | TEMPERATURE: 97.5 F

## 2021-10-06 DIAGNOSIS — I10 HYPERTENSION, UNSPECIFIED TYPE: Primary | ICD-10-CM

## 2021-10-06 LAB
ALBUMIN SERPL-MCNC: 3.6 G/DL (ref 3.5–5)
ALBUMIN/GLOB SERPL: 0.9 {RATIO} (ref 1.2–3.5)
ALP SERPL-CCNC: 54 U/L (ref 50–136)
ALT SERPL-CCNC: 16 U/L (ref 12–65)
ANION GAP SERPL CALC-SCNC: 9 MMOL/L (ref 7–16)
AST SERPL-CCNC: 12 U/L (ref 15–37)
ATRIAL RATE: 71 BPM
BASOPHILS # BLD: 0.1 K/UL (ref 0–0.2)
BASOPHILS NFR BLD: 1 % (ref 0–2)
BILIRUB SERPL-MCNC: 0.4 MG/DL (ref 0.2–1.1)
BUN SERPL-MCNC: 9 MG/DL (ref 6–23)
CALCIUM SERPL-MCNC: 9 MG/DL (ref 8.3–10.4)
CALCULATED P AXIS, ECG09: 76 DEGREES
CALCULATED R AXIS, ECG10: -54 DEGREES
CALCULATED T AXIS, ECG11: 77 DEGREES
CHLORIDE SERPL-SCNC: 110 MMOL/L (ref 98–107)
CO2 SERPL-SCNC: 22 MMOL/L (ref 21–32)
CREAT SERPL-MCNC: 0.81 MG/DL (ref 0.6–1)
DIAGNOSIS, 93000: NORMAL
DIFFERENTIAL METHOD BLD: NORMAL
EOSINOPHIL # BLD: 0.1 K/UL (ref 0–0.8)
EOSINOPHIL NFR BLD: 2 % (ref 0.5–7.8)
ERYTHROCYTE [DISTWIDTH] IN BLOOD BY AUTOMATED COUNT: 13.2 % (ref 11.9–14.6)
GLOBULIN SER CALC-MCNC: 4.1 G/DL (ref 2.3–3.5)
GLUCOSE SERPL-MCNC: 100 MG/DL (ref 65–100)
HCT VFR BLD AUTO: 45.1 % (ref 35.8–46.3)
HGB BLD-MCNC: 14.5 G/DL (ref 11.7–15.4)
IMM GRANULOCYTES # BLD AUTO: 0 K/UL (ref 0–0.5)
IMM GRANULOCYTES NFR BLD AUTO: 0 % (ref 0–5)
LIPASE SERPL-CCNC: 96 U/L (ref 73–393)
LYMPHOCYTES # BLD: 1.4 K/UL (ref 0.5–4.6)
LYMPHOCYTES NFR BLD: 18 % (ref 13–44)
MAGNESIUM SERPL-MCNC: 2.1 MG/DL (ref 1.8–2.4)
MCH RBC QN AUTO: 29.4 PG (ref 26.1–32.9)
MCHC RBC AUTO-ENTMCNC: 32.2 G/DL (ref 31.4–35)
MCV RBC AUTO: 91.5 FL (ref 79.6–97.8)
MONOCYTES # BLD: 0.5 K/UL (ref 0.1–1.3)
MONOCYTES NFR BLD: 7 % (ref 4–12)
NEUTS SEG # BLD: 5.7 K/UL (ref 1.7–8.2)
NEUTS SEG NFR BLD: 73 % (ref 43–78)
NRBC # BLD: 0 K/UL (ref 0–0.2)
P-R INTERVAL, ECG05: 136 MS
PLATELET # BLD AUTO: 245 K/UL (ref 150–450)
PMV BLD AUTO: 10.9 FL (ref 9.4–12.3)
POTASSIUM SERPL-SCNC: 4 MMOL/L (ref 3.5–5.1)
PROT SERPL-MCNC: 7.7 G/DL (ref 6.3–8.2)
Q-T INTERVAL, ECG07: 380 MS
QRS DURATION, ECG06: 76 MS
QTC CALCULATION (BEZET), ECG08: 412 MS
RBC # BLD AUTO: 4.93 M/UL (ref 4.05–5.2)
SODIUM SERPL-SCNC: 141 MMOL/L (ref 136–145)
TROPONIN-HIGH SENSITIVITY: 9.3 PG/ML (ref 0–14)
VENTRICULAR RATE, ECG03: 71 BPM
WBC # BLD AUTO: 7.8 K/UL (ref 4.3–11.1)

## 2021-10-06 PROCEDURE — 83690 ASSAY OF LIPASE: CPT

## 2021-10-06 PROCEDURE — 93005 ELECTROCARDIOGRAM TRACING: CPT

## 2021-10-06 PROCEDURE — 83735 ASSAY OF MAGNESIUM: CPT

## 2021-10-06 PROCEDURE — 94762 N-INVAS EAR/PLS OXIMTRY CONT: CPT

## 2021-10-06 PROCEDURE — 99284 EMERGENCY DEPT VISIT MOD MDM: CPT

## 2021-10-06 PROCEDURE — 74011250637 HC RX REV CODE- 250/637: Performed by: EMERGENCY MEDICINE

## 2021-10-06 PROCEDURE — 71046 X-RAY EXAM CHEST 2 VIEWS: CPT

## 2021-10-06 PROCEDURE — 84484 ASSAY OF TROPONIN QUANT: CPT

## 2021-10-06 PROCEDURE — 80053 COMPREHEN METABOLIC PANEL: CPT

## 2021-10-06 PROCEDURE — 85025 COMPLETE CBC W/AUTO DIFF WBC: CPT

## 2021-10-06 RX ORDER — ONDANSETRON 4 MG/1
4 TABLET, ORALLY DISINTEGRATING ORAL
Status: COMPLETED | OUTPATIENT
Start: 2021-10-06 | End: 2021-10-06

## 2021-10-06 RX ORDER — AMLODIPINE BESYLATE 10 MG/1
10 TABLET ORAL
Status: COMPLETED | OUTPATIENT
Start: 2021-10-06 | End: 2021-10-06

## 2021-10-06 RX ORDER — AMLODIPINE BESYLATE 5 MG/1
5 TABLET ORAL DAILY
Qty: 30 TABLET | Refills: 0 | Status: SHIPPED | OUTPATIENT
Start: 2021-10-06 | End: 2022-01-02 | Stop reason: SDUPTHER

## 2021-10-06 RX ORDER — SODIUM CHLORIDE 0.9 % (FLUSH) 0.9 %
5-10 SYRINGE (ML) INJECTION AS NEEDED
Status: DISCONTINUED | OUTPATIENT
Start: 2021-10-06 | End: 2021-10-06 | Stop reason: HOSPADM

## 2021-10-06 RX ORDER — SODIUM CHLORIDE 0.9 % (FLUSH) 0.9 %
5-10 SYRINGE (ML) INJECTION EVERY 8 HOURS
Status: DISCONTINUED | OUTPATIENT
Start: 2021-10-06 | End: 2021-10-06 | Stop reason: HOSPADM

## 2021-10-06 RX ADMIN — ONDANSETRON 4 MG: 4 TABLET, ORALLY DISINTEGRATING ORAL at 11:47

## 2021-10-06 RX ADMIN — AMLODIPINE BESYLATE 10 MG: 10 TABLET ORAL at 11:39

## 2021-10-06 NOTE — ED TRIAGE NOTES
Patient ambulatory to triage with mask in place. Patient reports she has been dealing with a stressful situation at home with her boyfriend. Pt reports feeling dizzy, tired, nausea and blurred vision. Pt reports she has HTN but hasn't been taking her medications x 1 week. Denies chest pain or shob.

## 2021-10-06 NOTE — ED NOTES
I have reviewed discharge instructions with the patient. The patient verbalized understanding. Patient left ED via Discharge Method: ambulatory to Home with self. Opportunity for questions and clarification provided. Patient given 1 scripts. To continue your aftercare when you leave the hospital, you may receive an automated call from our care team to check in on how you are doing. This is a free service and part of our promise to provide the best care and service to meet your aftercare needs.  If you have questions, or wish to unsubscribe from this service please call [phone]. Thank you for Choosing our [facility] Emergency Department.
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
## 2021-10-06 NOTE — PROGRESS NOTES
Care Management Interventions  PCP Verified by CM: No  Transition of Care Consult (CM Consult): Discharge Planning, Medication Assistance  The Plan for Transition of Care is Related to the Following Treatment Goals : Home   The Patient and/or Patient Representative was Provided with a Choice of Provider and Agrees with the Discharge Plan?: Yes  Name of the Patient Representative Who was Provided with a Choice of Provider and Agrees with the Discharge Plan: Patient   Freedom of Choice List was Provided with Basic Dialogue that Supports the Patient's Individualized Plan of Care/Goals, Treatment Preferences and Shares the Quality Data Associated with the Providers?: Yes  Discharge Location  Discharge Placement: Home          CM consult to assist with medication assistance and PCP. CM spoke with [facility]; patient was last seen in 2018. Follow up appt has been scheduled for 10/12/2021 at 12pm with Dr. Jada Slaughter. Patient is in agreement with DCP. Patient was given information for Emily Reynaga and Sadaf Kent PCP office. No further CM needs.

## 2022-01-02 ENCOUNTER — HOSPITAL ENCOUNTER (EMERGENCY)
Age: 42
Discharge: HOME | End: 2022-01-02
Attending: EMERGENCY MEDICINE

## 2022-01-02 ENCOUNTER — APPOINTMENT (OUTPATIENT)
Dept: CT IMAGING | Age: 42
End: 2022-01-02
Attending: EMERGENCY MEDICINE

## 2022-01-02 VITALS
BODY MASS INDEX: 31.39 KG/M2 | WEIGHT: 200 LBS | HEIGHT: 67 IN | RESPIRATION RATE: 16 BRPM | DIASTOLIC BLOOD PRESSURE: 116 MMHG | TEMPERATURE: 98.5 F | OXYGEN SATURATION: 99 % | SYSTOLIC BLOOD PRESSURE: 162 MMHG | HEART RATE: 81 BPM

## 2022-01-02 DIAGNOSIS — R59.0 CERVICAL LYMPHADENOPATHY: ICD-10-CM

## 2022-01-02 DIAGNOSIS — M54.2 NECK PAIN: Primary | ICD-10-CM

## 2022-01-02 PROCEDURE — 74011250636 HC RX REV CODE- 250/636: Performed by: EMERGENCY MEDICINE

## 2022-01-02 PROCEDURE — 72125 CT NECK SPINE W/O DYE: CPT

## 2022-01-02 PROCEDURE — 70450 CT HEAD/BRAIN W/O DYE: CPT

## 2022-01-02 PROCEDURE — 99282 EMERGENCY DEPT VISIT SF MDM: CPT

## 2022-01-02 PROCEDURE — 96372 THER/PROPH/DIAG INJ SC/IM: CPT

## 2022-01-02 RX ORDER — AMLODIPINE BESYLATE 5 MG/1
5 TABLET ORAL DAILY
Qty: 30 TABLET | Refills: 0 | Status: SHIPPED | OUTPATIENT
Start: 2022-01-02

## 2022-01-02 RX ORDER — METAXALONE 800 MG/1
800 TABLET ORAL 3 TIMES DAILY
Qty: 20 TABLET | Refills: 0 | Status: SHIPPED | OUTPATIENT
Start: 2022-01-02

## 2022-01-02 RX ORDER — NAPROXEN 500 MG/1
500 TABLET ORAL AS NEEDED
Qty: 20 TABLET | Refills: 0 | Status: SHIPPED | OUTPATIENT
Start: 2022-01-02

## 2022-01-02 RX ORDER — KETOROLAC TROMETHAMINE 30 MG/ML
30 INJECTION, SOLUTION INTRAMUSCULAR; INTRAVENOUS
Status: COMPLETED | OUTPATIENT
Start: 2022-01-02 | End: 2022-01-02

## 2022-01-02 RX ADMIN — KETOROLAC TROMETHAMINE 30 MG: 30 INJECTION, SOLUTION INTRAMUSCULAR at 14:59

## 2022-01-02 NOTE — Clinical Note
[facility] EMERGENCY DEPT   [facility] [address] [phone]    Work/School Note    Date: 1/2/2022    To Whom It May concern:      Lor Aguayo was seen and treated today in the emergency room by the following provider(s):  Attending Provider: Merlin Gondola, MD.      Lor Aguayo is excused from work/school on 01/02/22. She is clear to return to work/school on 01/03/22.         Sincerely,          Rabia Winters MD
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
## 2022-01-02 NOTE — DISCHARGE INSTRUCTIONS
Place neck through range of motion daily to prevent worsening stiffness. Alternate ice and heat. Return for worsening or concerning symptoms.

## 2022-01-02 NOTE — ED NOTES
I have reviewed discharge instructions with the patient. The patient verbalized understanding. Patient left ED via Discharge Method: ambulatory to Home with self. Opportunity for questions and clarification provided. Patient given 0 scripts. To continue your aftercare when you leave the hospital, you may receive an automated call from our care team to check in on how you are doing. This is a free service and part of our promise to provide the best care and service to meet your aftercare needs.  If you have questions, or wish to unsubscribe from this service please call [phone]. Thank you for Choosing our [facility] Emergency Department.
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
## 2022-03-29 ENCOUNTER — APPOINTMENT (OUTPATIENT)
Dept: ULTRASOUND IMAGING | Age: 42
End: 2022-03-29
Attending: NURSE PRACTITIONER
Payer: COMMERCIAL

## 2022-03-29 ENCOUNTER — HOSPITAL ENCOUNTER (EMERGENCY)
Age: 42
Discharge: HOME | End: 2022-03-29
Attending: EMERGENCY MEDICINE | Admitting: EMERGENCY MEDICINE
Payer: COMMERCIAL

## 2022-03-29 ENCOUNTER — APPOINTMENT (OUTPATIENT)
Dept: GENERAL RADIOLOGY | Age: 42
End: 2022-03-29
Payer: COMMERCIAL

## 2022-03-29 VITALS
HEART RATE: 80 BPM | SYSTOLIC BLOOD PRESSURE: 176 MMHG | OXYGEN SATURATION: 99 % | HEIGHT: 67 IN | TEMPERATURE: 98.4 F | WEIGHT: 200 LBS | RESPIRATION RATE: 16 BRPM | DIASTOLIC BLOOD PRESSURE: 110 MMHG | BODY MASS INDEX: 31.39 KG/M2

## 2022-03-29 DIAGNOSIS — N61.1 LEFT BREAST ABSCESS: Primary | ICD-10-CM

## 2022-03-29 LAB
ALBUMIN SERPL-MCNC: 3.3 G/DL (ref 3.5–5)
ALBUMIN/GLOB SERPL: 0.8 {RATIO} (ref 1.2–3.5)
ALP SERPL-CCNC: 51 U/L (ref 50–130)
ALT SERPL-CCNC: 19 U/L (ref 12–65)
ANION GAP SERPL CALC-SCNC: 7 MMOL/L (ref 7–16)
AST SERPL-CCNC: 24 U/L (ref 15–37)
BACTERIA URNS QL MICRO: ABNORMAL /HPF
BASOPHILS # BLD: 0.1 K/UL (ref 0–0.2)
BASOPHILS NFR BLD: 1 % (ref 0–2)
BILIRUB SERPL-MCNC: 0.4 MG/DL (ref 0.2–1.1)
BUN SERPL-MCNC: 11 MG/DL (ref 6–23)
CALCIUM SERPL-MCNC: 8.7 MG/DL (ref 8.3–10.4)
CASTS URNS QL MICRO: ABNORMAL /LPF
CHLORIDE SERPL-SCNC: 109 MMOL/L (ref 98–107)
CO2 SERPL-SCNC: 24 MMOL/L (ref 21–32)
CREAT SERPL-MCNC: 0.92 MG/DL (ref 0.6–1)
CRYSTALS URNS QL MICRO: 0 /LPF
DIFFERENTIAL METHOD BLD: NORMAL
EOSINOPHIL # BLD: 0.1 K/UL (ref 0–0.8)
EOSINOPHIL NFR BLD: 1 % (ref 0.5–7.8)
EPI CELLS #/AREA URNS HPF: ABNORMAL /HPF
ERYTHROCYTE [DISTWIDTH] IN BLOOD BY AUTOMATED COUNT: 13.4 % (ref 11.9–14.6)
GLOBULIN SER CALC-MCNC: 4.2 G/DL (ref 2.3–3.5)
GLUCOSE SERPL-MCNC: 93 MG/DL (ref 65–100)
HCG UR QL: NEGATIVE
HCT VFR BLD AUTO: 46.1 % (ref 35.8–46.3)
HGB BLD-MCNC: 14.7 G/DL (ref 11.7–15.4)
IMM GRANULOCYTES # BLD AUTO: 0 K/UL (ref 0–0.5)
IMM GRANULOCYTES NFR BLD AUTO: 0 % (ref 0–5)
LYMPHOCYTES # BLD: 2.1 K/UL (ref 0.5–4.6)
LYMPHOCYTES NFR BLD: 20 % (ref 13–44)
MCH RBC QN AUTO: 30.2 PG (ref 26.1–32.9)
MCHC RBC AUTO-ENTMCNC: 31.9 G/DL (ref 31.4–35)
MCV RBC AUTO: 94.9 FL (ref 79.6–97.8)
MONOCYTES # BLD: 0.6 K/UL (ref 0.1–1.3)
MONOCYTES NFR BLD: 6 % (ref 4–12)
MUCOUS THREADS URNS QL MICRO: ABNORMAL /LPF
NEUTS SEG # BLD: 7.4 K/UL (ref 1.7–8.2)
NEUTS SEG NFR BLD: 72 % (ref 43–78)
NRBC # BLD: 0 K/UL (ref 0–0.2)
OTHER OBSERVATIONS,UCOM: ABNORMAL
PLATELET # BLD AUTO: 219 K/UL (ref 150–450)
PMV BLD AUTO: 11 FL (ref 9.4–12.3)
POTASSIUM SERPL-SCNC: 4.7 MMOL/L (ref 3.5–5.1)
PROCALCITONIN SERPL-MCNC: <0.05 NG/ML (ref 0–0.49)
PROT SERPL-MCNC: 7.5 G/DL (ref 6.3–8.2)
RBC # BLD AUTO: 4.86 M/UL (ref 4.05–5.2)
RBC #/AREA URNS HPF: ABNORMAL /HPF
SODIUM SERPL-SCNC: 140 MMOL/L (ref 136–145)
WBC # BLD AUTO: 10.3 K/UL (ref 4.3–11.1)
WBC URNS QL MICRO: ABNORMAL /HPF

## 2022-03-29 PROCEDURE — 74011250637 HC RX REV CODE- 250/637: Performed by: NURSE PRACTITIONER

## 2022-03-29 PROCEDURE — 81015 MICROSCOPIC EXAM OF URINE: CPT

## 2022-03-29 PROCEDURE — 87086 URINE CULTURE/COLONY COUNT: CPT

## 2022-03-29 PROCEDURE — 81025 URINE PREGNANCY TEST: CPT

## 2022-03-29 PROCEDURE — 99284 EMERGENCY DEPT VISIT MOD MDM: CPT

## 2022-03-29 PROCEDURE — 71046 X-RAY EXAM CHEST 2 VIEWS: CPT

## 2022-03-29 PROCEDURE — 76642 ULTRASOUND BREAST LIMITED: CPT

## 2022-03-29 PROCEDURE — 84145 PROCALCITONIN (PCT): CPT

## 2022-03-29 PROCEDURE — 80053 COMPREHEN METABOLIC PANEL: CPT

## 2022-03-29 PROCEDURE — 81003 URINALYSIS AUTO W/O SCOPE: CPT

## 2022-03-29 PROCEDURE — 85025 COMPLETE CBC W/AUTO DIFF WBC: CPT

## 2022-03-29 RX ORDER — CLINDAMYCIN HYDROCHLORIDE 150 MG/1
450 CAPSULE ORAL
Status: COMPLETED | OUTPATIENT
Start: 2022-03-29 | End: 2022-03-29

## 2022-03-29 RX ORDER — CLINDAMYCIN HYDROCHLORIDE 150 MG/1
450 CAPSULE ORAL 3 TIMES DAILY
Qty: 63 CAPSULE | Refills: 0 | Status: SHIPPED | OUTPATIENT
Start: 2022-03-29 | End: 2022-04-05

## 2022-03-29 RX ADMIN — CLINDAMYCIN HYDROCHLORIDE 450 MG: 150 CAPSULE ORAL at 18:30

## 2022-03-29 NOTE — ED NOTES
I have reviewed discharge instructions with the patient. The patient verbalized understanding. Patient left ED via Discharge Method: ambulatory to Home with self. Opportunity for questions and clarification provided. Patient given 1 scripts. To continue your aftercare when you leave the hospital, you may receive an automated call from our care team to check in on how you are doing. This is a free service and part of our promise to provide the best care and service to meet your aftercare needs.  If you have questions, or wish to unsubscribe from this service please call [phone]. Thank you for Choosing our [address] [place] [address] Emergency Department.

## 2022-03-29 NOTE — ED NOTES
47 yo female presents for lethargy, increased shortness of breath x 2 weeks. States painful lump on left breast just medial of left nipple. approx 2cxm L x 1.5 cm W last night. 2cm x 1.5 cm firm lump in left breast.      Patient evaluated initially in triage. Rapid Medical Evaluation was conducted and necessary orders have been placed. I have performed a medical screening exam.  Care will now be transferred to the provider in the back of the emergency department.   HIRAL Corrales 2:52 PM

## 2022-03-29 NOTE — ED TRIAGE NOTES
Pt with painful lump to left breast last night. Pt states she has had mammogram in the past. Pt has family history of breast cancer.

## 2022-03-29 NOTE — Clinical Note
[address] [address] EMERGENCY DEPT  [address] [facility] [address] [phone]    Work/School Note    Date: 3/29/2022    To Whom It May concern:    Ana Ledezma was seen and treated today in the emergency room by the following provider(s):  Attending Provider: Mercedes Parisi DO  Nurse Practitioner: Leah Carlos NP. Ana Ledezma is excused from work/school on 03/29/22 and 03/30/22. She is medically clear to return to work/school on 3/31/2022.        Sincerely,          Apple Gregg RN

## 2022-03-29 NOTE — DISCHARGE INSTRUCTIONS
Take clindamycin 3 times daily for 7 days. Make sure take the entire course of antibiotics. Continue taking her high blood pressure medication as her blood pressure was elevated today at 176/110. Call in the morning to schedule appointment to be seen by general surgery urgently for drainage of your breast abscess. Call and schedule appointment to be seen by primary care for new patient establishment. Return to the ED for new or worsening symptoms.   Remainder of your blood work and urine studies are normal.    Call Dr. Sandy Fuentes at [phone] for new patient appointment
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
## 2022-03-29 NOTE — Clinical Note
[id number] [address] EMERGENCY DEPT  [address] [address] [phone]    Work/School Note    Date: 3/29/2022    To Whom It May concern:    Magnus Higuera was seen and treated today in the emergency room by the following provider(s):  Attending Provider: Jessenia Jimenez DO  Nurse Practitioner: Immanuel Unger NP. Magnus Higuera is excused from work/school on 03/29/22 and 03/30/22. She is medically clear to return to work/school on 3/31/2022.        Sincerely,          Daralene Lennox, NP

## 2022-03-30 PROBLEM — N61.0 BREAST INFECTION: Status: ACTIVE | Noted: 2022-03-30

## 2022-04-03 LAB
BACTERIA SPEC CULT: NORMAL
SERVICE CMNT-IMP: NORMAL

## 2022-04-06 ENCOUNTER — HOSPITAL ENCOUNTER (EMERGENCY)
Age: 42
Discharge: HOME | End: 2022-04-06
Attending: STUDENT IN AN ORGANIZED HEALTH CARE EDUCATION/TRAINING PROGRAM
Payer: COMMERCIAL

## 2022-04-06 VITALS
DIASTOLIC BLOOD PRESSURE: 84 MMHG | OXYGEN SATURATION: 100 % | HEIGHT: 67 IN | RESPIRATION RATE: 19 BRPM | WEIGHT: 200 LBS | TEMPERATURE: 98.9 F | HEART RATE: 74 BPM | SYSTOLIC BLOOD PRESSURE: 136 MMHG | BODY MASS INDEX: 31.39 KG/M2

## 2022-04-06 DIAGNOSIS — N63.24 MASS OF LOWER INNER QUADRANT OF LEFT BREAST: Primary | ICD-10-CM

## 2022-04-06 DIAGNOSIS — R53.83 FATIGUE, UNSPECIFIED TYPE: ICD-10-CM

## 2022-04-06 DIAGNOSIS — N61.1 BREAST ABSCESS: ICD-10-CM

## 2022-04-06 LAB
ALBUMIN SERPL-MCNC: 3.6 G/DL (ref 3.5–5)
ALBUMIN/GLOB SERPL: 0.9 {RATIO} (ref 1.2–3.5)
ALP SERPL-CCNC: 56 U/L (ref 50–130)
ALT SERPL-CCNC: 24 U/L (ref 12–65)
ANION GAP SERPL CALC-SCNC: 6 MMOL/L (ref 7–16)
AST SERPL-CCNC: 16 U/L (ref 15–37)
BACTERIA URNS QL MICRO: 0 /HPF
BASOPHILS # BLD: 0.1 K/UL (ref 0–0.2)
BASOPHILS NFR BLD: 1 % (ref 0–2)
BILIRUB SERPL-MCNC: 0.2 MG/DL (ref 0.2–1.1)
BUN SERPL-MCNC: 10 MG/DL (ref 6–23)
CALCIUM SERPL-MCNC: 8.8 MG/DL (ref 8.3–10.4)
CASTS URNS QL MICRO: 0 /LPF
CHLORIDE SERPL-SCNC: 109 MMOL/L (ref 98–107)
CO2 SERPL-SCNC: 25 MMOL/L (ref 21–32)
CREAT SERPL-MCNC: 0.95 MG/DL (ref 0.6–1)
CRYSTALS URNS QL MICRO: 0 /LPF
DIFFERENTIAL METHOD BLD: NORMAL
EOSINOPHIL # BLD: 0.2 K/UL (ref 0–0.8)
EOSINOPHIL NFR BLD: 2 % (ref 0.5–7.8)
EPI CELLS #/AREA URNS HPF: NORMAL /HPF
ERYTHROCYTE [DISTWIDTH] IN BLOOD BY AUTOMATED COUNT: 13.3 % (ref 11.9–14.6)
GLOBULIN SER CALC-MCNC: 4.1 G/DL (ref 2.3–3.5)
GLUCOSE SERPL-MCNC: 95 MG/DL (ref 65–100)
HCG UR QL: NEGATIVE
HCT VFR BLD AUTO: 42.6 % (ref 35.8–46.3)
HGB BLD-MCNC: 13.8 G/DL (ref 11.7–15.4)
IMM GRANULOCYTES # BLD AUTO: 0 K/UL (ref 0–0.5)
IMM GRANULOCYTES NFR BLD AUTO: 0 % (ref 0–5)
LIPASE SERPL-CCNC: 139 U/L (ref 73–393)
LYMPHOCYTES # BLD: 2.1 K/UL (ref 0.5–4.6)
LYMPHOCYTES NFR BLD: 27 % (ref 13–44)
MCH RBC QN AUTO: 29.7 PG (ref 26.1–32.9)
MCHC RBC AUTO-ENTMCNC: 32.4 G/DL (ref 31.4–35)
MCV RBC AUTO: 91.6 FL (ref 79.6–97.8)
MONOCYTES # BLD: 0.4 K/UL (ref 0.1–1.3)
MONOCYTES NFR BLD: 5 % (ref 4–12)
MUCOUS THREADS URNS QL MICRO: NORMAL /LPF
NEUTS SEG # BLD: 5.1 K/UL (ref 1.7–8.2)
NEUTS SEG NFR BLD: 65 % (ref 43–78)
NRBC # BLD: 0 K/UL (ref 0–0.2)
PLATELET # BLD AUTO: 229 K/UL (ref 150–450)
PMV BLD AUTO: 10.5 FL (ref 9.4–12.3)
POTASSIUM SERPL-SCNC: 3.9 MMOL/L (ref 3.5–5.1)
PROT SERPL-MCNC: 7.7 G/DL (ref 6.3–8.2)
RBC # BLD AUTO: 4.65 M/UL (ref 4.05–5.2)
RBC #/AREA URNS HPF: NORMAL /HPF
SODIUM SERPL-SCNC: 140 MMOL/L (ref 136–145)
WBC # BLD AUTO: 7.9 K/UL (ref 4.3–11.1)
WBC URNS QL MICRO: NORMAL /HPF

## 2022-04-06 PROCEDURE — 81003 URINALYSIS AUTO W/O SCOPE: CPT

## 2022-04-06 PROCEDURE — 96375 TX/PRO/DX INJ NEW DRUG ADDON: CPT

## 2022-04-06 PROCEDURE — 99284 EMERGENCY DEPT VISIT MOD MDM: CPT

## 2022-04-06 PROCEDURE — 81025 URINE PREGNANCY TEST: CPT

## 2022-04-06 PROCEDURE — 93005 ELECTROCARDIOGRAM TRACING: CPT | Performed by: PHYSICIAN ASSISTANT

## 2022-04-06 PROCEDURE — 96374 THER/PROPH/DIAG INJ IV PUSH: CPT

## 2022-04-06 PROCEDURE — 83690 ASSAY OF LIPASE: CPT

## 2022-04-06 PROCEDURE — 74011000250 HC RX REV CODE- 250: Performed by: PHYSICIAN ASSISTANT

## 2022-04-06 PROCEDURE — 81015 MICROSCOPIC EXAM OF URINE: CPT

## 2022-04-06 PROCEDURE — 85025 COMPLETE CBC W/AUTO DIFF WBC: CPT

## 2022-04-06 PROCEDURE — 80053 COMPREHEN METABOLIC PANEL: CPT

## 2022-04-06 PROCEDURE — 74011250636 HC RX REV CODE- 250/636: Performed by: PHYSICIAN ASSISTANT

## 2022-04-06 RX ORDER — IBUPROFEN 600 MG/1
600 TABLET ORAL
Qty: 20 TABLET | Refills: 0 | Status: SHIPPED | OUTPATIENT
Start: 2022-04-06

## 2022-04-06 RX ORDER — ONDANSETRON 4 MG/1
4 TABLET, FILM COATED ORAL
Qty: 12 TABLET | Refills: 0 | Status: SHIPPED | OUTPATIENT
Start: 2022-04-06

## 2022-04-06 RX ORDER — SODIUM CHLORIDE 0.9 % (FLUSH) 0.9 %
5-10 SYRINGE (ML) INJECTION EVERY 8 HOURS
Status: DISCONTINUED | OUTPATIENT
Start: 2022-04-06 | End: 2022-04-06 | Stop reason: HOSPADM

## 2022-04-06 RX ORDER — ONDANSETRON 2 MG/ML
4 INJECTION INTRAMUSCULAR; INTRAVENOUS
Status: COMPLETED | OUTPATIENT
Start: 2022-04-06 | End: 2022-04-06

## 2022-04-06 RX ORDER — SODIUM CHLORIDE 0.9 % (FLUSH) 0.9 %
5-10 SYRINGE (ML) INJECTION AS NEEDED
Status: DISCONTINUED | OUTPATIENT
Start: 2022-04-06 | End: 2022-04-06 | Stop reason: HOSPADM

## 2022-04-06 RX ORDER — LABETALOL HYDROCHLORIDE 5 MG/ML
20 INJECTION, SOLUTION INTRAVENOUS ONCE
Status: COMPLETED | OUTPATIENT
Start: 2022-04-06 | End: 2022-04-06

## 2022-04-06 RX ADMIN — SODIUM CHLORIDE 1000 ML: 900 INJECTION, SOLUTION INTRAVENOUS at 14:27

## 2022-04-06 RX ADMIN — ONDANSETRON 4 MG: 2 INJECTION INTRAMUSCULAR; INTRAVENOUS at 14:07

## 2022-04-06 RX ADMIN — LABETALOL HYDROCHLORIDE 20 MG: 5 INJECTION INTRAVENOUS at 15:42

## 2022-04-06 NOTE — DISCHARGE INSTRUCTIONS
Please continue your antibiotics as prescribed. Call the surgeon's office to inform them that the antibiotics have not been helping much and you may need to get into the office for follow-up   Please follow-up closely with the primary doctor regarding the fatigue as well. Return for any emergent or severe worsening symptoms. I also recommend scheduling an outpatient mammogram as discussed. Please drink plenty of clear fluids, increase diet as tolerated, take Zofran every 6-8 hours as needed for nausea/vomiting. Return for any emergent or severely worsening symptoms. You may take ibuprofen every 6-8 hours as needed for pain/discomfort. May also take Tylenol every 4-6 hours for pain.

## 2022-04-06 NOTE — ED NOTES
I have reviewed discharge instructions with the patient. The patient verbalized understanding. Patient left ED via Discharge Method: ambulatory to Home with home. Opportunity for questions and clarification provided. Patient given 1 scripts. To continue your aftercare when you leave the hospital, you may receive an automated call from our care team to check in on how you are doing. This is a free service and part of our promise to provide the best care and service to meet your aftercare needs.  If you have questions, or wish to unsubscribe from this service please call [phone]. Thank you for Choosing our [facility] Emergency Department.

## 2022-04-06 NOTE — ED NOTES
Patient here with concern for \"not feeling well\" since before last visit. Yanira having pain to left breast where she is being treated for abscess. Reports had fever last night but does not recall how high it was. Also had sweating at night. Taking Bactrim DS as prescribed. Afebrile, RRR, NAD. Patient evaluated initially in triage. Rapid Medical Evaluation was conducted and necessary orders have been placed. I have performed a medical screening exam.  Care will now be transferred to the provider in the back of the emergency department.   Onur Browning 1:57 PM
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
## 2022-04-07 PROBLEM — I10 HYPERTENSIVE DISORDER: Status: ACTIVE | Noted: 2017-10-27

## 2022-04-07 LAB
ATRIAL RATE: 71 BPM
CALCULATED P AXIS, ECG09: 73 DEGREES
CALCULATED R AXIS, ECG10: -33 DEGREES
CALCULATED T AXIS, ECG11: 79 DEGREES
DIAGNOSIS, 93000: NORMAL
P-R INTERVAL, ECG05: 150 MS
Q-T INTERVAL, ECG07: 386 MS
QRS DURATION, ECG06: 70 MS
QTC CALCULATION (BEZET), ECG08: 419 MS
VENTRICULAR RATE, ECG03: 71 BPM

## 2022-04-09 PROBLEM — N63.24 MASS OF LOWER INNER QUADRANT OF LEFT BREAST: Status: ACTIVE | Noted: 2022-04-09

## 2022-04-09 PROBLEM — Z72.0 TOBACCO USER: Status: ACTIVE | Noted: 2022-04-09

## 2022-04-11 PROBLEM — N63.24 MASS OF LOWER INNER QUADRANT OF LEFT BREAST: Status: ACTIVE | Noted: 2022-04-09

## 2022-04-11 PROBLEM — I10 HYPERTENSIVE DISORDER: Status: ACTIVE | Noted: 2017-10-27

## 2022-04-11 PROBLEM — Z72.0 TOBACCO USER: Status: ACTIVE | Noted: 2022-04-09

## 2022-04-14 ENCOUNTER — HOSPITAL ENCOUNTER (OUTPATIENT)
Dept: MAMMOGRAPHY | Age: 42
Discharge: HOME | End: 2022-04-14
Attending: NURSE PRACTITIONER
Payer: COMMERCIAL

## 2022-04-14 DIAGNOSIS — N64.4 BREAST PAIN: ICD-10-CM

## 2022-04-14 DIAGNOSIS — N63.24 MASS OF LOWER INNER QUADRANT OF LEFT BREAST: ICD-10-CM

## 2022-04-14 PROCEDURE — 76642 ULTRASOUND BREAST LIMITED: CPT

## 2022-04-14 PROCEDURE — 77066 DX MAMMO INCL CAD BI: CPT

## 2022-04-15 NOTE — PROGRESS NOTES
Can you call pt? Your mammogram and ultrasound did show the mass on your left breast has slightly increased in size. The radiologists impression is it is most likely an abscess. Was the aspiration performed yesterday? Do you have an appointment on Monday with Dr. Vadim Burnett? How are you feeling? Please rto next week for follow up.

## 2022-04-19 ENCOUNTER — HOSPITAL ENCOUNTER (OUTPATIENT)
Dept: MAMMOGRAPHY | Age: 42
Discharge: HOME | End: 2022-04-19
Attending: NURSE PRACTITIONER
Payer: COMMERCIAL

## 2022-04-19 VITALS — DIASTOLIC BLOOD PRESSURE: 93 MMHG | SYSTOLIC BLOOD PRESSURE: 138 MMHG | HEART RATE: 73 BPM

## 2022-04-19 DIAGNOSIS — N63.20 MASS OF LEFT BREAST: ICD-10-CM

## 2022-04-19 DIAGNOSIS — R92.8 ABNORMAL ULTRASOUND OF BREAST: ICD-10-CM

## 2022-04-19 DIAGNOSIS — N61.1 BREAST ABSCESS: ICD-10-CM

## 2022-04-19 PROCEDURE — 77065 DX MAMMO INCL CAD UNI: CPT

## 2022-04-19 PROCEDURE — 88305 TISSUE EXAM BY PATHOLOGIST: CPT

## 2022-04-19 PROCEDURE — 19083 BX BREAST 1ST LESION US IMAG: CPT

## 2022-04-19 PROCEDURE — 74011000250 HC RX REV CODE- 250: Performed by: NURSE PRACTITIONER

## 2022-04-19 RX ORDER — LIDOCAINE HYDROCHLORIDE 10 MG/ML
8 INJECTION INFILTRATION; PERINEURAL
Status: COMPLETED | OUTPATIENT
Start: 2022-04-19 | End: 2022-04-19

## 2022-04-19 RX ADMIN — LIDOCAINE HYDROCHLORIDE 8 ML: 10 INJECTION, SOLUTION INFILTRATION; PERINEURAL at 09:25

## 2022-04-21 NOTE — PROGRESS NOTES
Left breast biopsy results of BENIGN BREAST HAVING FOCI OF ACUTE AND CHRONIC INFLAMMATION,   NONDIAGNOSTIC FOR MALIGNANT TUMOR. Recommend repeat US in 6 months. RTO as needed for persistent symptoms.

## 2022-04-21 NOTE — PROGRESS NOTES
Please let pt know to continue follow up with Dr. Debbie Schulz next week. In addition to the US recommend in 6 months additional imaging is recommended in 3-5 weeks.

## 2023-06-29 ENCOUNTER — HOSPITAL ENCOUNTER (EMERGENCY)
Age: 43
Discharge: HOME | End: 2023-06-29
Attending: EMERGENCY MEDICINE

## 2023-06-29 VITALS
OXYGEN SATURATION: 98 % | DIASTOLIC BLOOD PRESSURE: 86 MMHG | HEART RATE: 91 BPM | BODY MASS INDEX: 32.18 KG/M2 | TEMPERATURE: 97.9 F | RESPIRATION RATE: 20 BRPM | SYSTOLIC BLOOD PRESSURE: 126 MMHG | WEIGHT: 205 LBS | HEIGHT: 67 IN

## 2023-06-29 DIAGNOSIS — T63.334A BROWN RECLUSE SPIDER BITE OR STING, UNDETERMINED INTENT, INITIAL ENCOUNTER: Primary | ICD-10-CM

## 2023-06-29 PROCEDURE — 6370000000 HC RX 637 (ALT 250 FOR IP): Performed by: EMERGENCY MEDICINE

## 2023-06-29 PROCEDURE — 6360000002 HC RX W HCPCS: Performed by: EMERGENCY MEDICINE

## 2023-06-29 PROCEDURE — 96372 THER/PROPH/DIAG INJ SC/IM: CPT

## 2023-06-29 PROCEDURE — 90471 IMMUNIZATION ADMIN: CPT | Performed by: EMERGENCY MEDICINE

## 2023-06-29 PROCEDURE — 99284 EMERGENCY DEPT VISIT MOD MDM: CPT

## 2023-06-29 PROCEDURE — 90715 TDAP VACCINE 7 YRS/> IM: CPT | Performed by: EMERGENCY MEDICINE

## 2023-06-29 RX ORDER — CEPHALEXIN 500 MG/1
500 CAPSULE ORAL
Status: COMPLETED | OUTPATIENT
Start: 2023-06-29 | End: 2023-06-29

## 2023-06-29 RX ORDER — KETOROLAC TROMETHAMINE 30 MG/ML
30 INJECTION, SOLUTION INTRAMUSCULAR; INTRAVENOUS
Status: COMPLETED | OUTPATIENT
Start: 2023-06-29 | End: 2023-06-29

## 2023-06-29 RX ORDER — CEPHALEXIN 500 MG/1
500 CAPSULE ORAL 2 TIMES DAILY
Qty: 14 CAPSULE | Refills: 0 | Status: SHIPPED | OUTPATIENT
Start: 2023-06-29 | End: 2023-07-06

## 2023-06-29 RX ORDER — KETOROLAC TROMETHAMINE 10 MG/1
10 TABLET, FILM COATED ORAL EVERY 6 HOURS PRN
Qty: 20 TABLET | Refills: 0 | Status: SHIPPED | OUTPATIENT
Start: 2023-06-29

## 2023-06-29 RX ADMIN — CEPHALEXIN 500 MG: 500 CAPSULE ORAL at 22:42

## 2023-06-29 RX ADMIN — TETANUS TOXOID, REDUCED DIPHTHERIA TOXOID AND ACELLULAR PERTUSSIS VACCINE, ADSORBED 0.5 ML: 5; 2.5; 8; 8; 2.5 SUSPENSION INTRAMUSCULAR at 22:54

## 2023-06-29 RX ADMIN — KETOROLAC TROMETHAMINE 30 MG: 30 INJECTION, SOLUTION INTRAMUSCULAR at 22:42

## 2023-06-29 ASSESSMENT — ENCOUNTER SYMPTOMS
EYE PAIN: 0
EYE REDNESS: 0
SHORTNESS OF BREATH: 0
NAUSEA: 0
STRIDOR: 0
EYE ITCHING: 0
RHINORRHEA: 0
EYE DISCHARGE: 0
CONSTIPATION: 0
COUGH: 0
VOMITING: 0
BACK PAIN: 0
ABDOMINAL PAIN: 0
FACIAL SWELLING: 0
DIARRHEA: 0
WHEEZING: 0

## 2023-06-29 ASSESSMENT — PAIN - FUNCTIONAL ASSESSMENT: PAIN_FUNCTIONAL_ASSESSMENT: 0-10

## 2023-06-29 ASSESSMENT — LIFESTYLE VARIABLES
HOW MANY STANDARD DRINKS CONTAINING ALCOHOL DO YOU HAVE ON A TYPICAL DAY: 3 OR 4
HOW OFTEN DO YOU HAVE A DRINK CONTAINING ALCOHOL: 2-4 TIMES A MONTH

## 2023-06-29 ASSESSMENT — PAIN SCALES - GENERAL: PAINLEVEL_OUTOF10: 8

## 2023-06-29 ASSESSMENT — PAIN DESCRIPTION - ORIENTATION: ORIENTATION: RIGHT

## 2023-06-29 ASSESSMENT — PAIN DESCRIPTION - LOCATION: LOCATION: ARM

## 2023-06-29 ASSESSMENT — PAIN DESCRIPTION - PAIN TYPE: TYPE: ACUTE PAIN

## 2023-07-03 ENCOUNTER — HOSPITAL ENCOUNTER (EMERGENCY)
Age: 43
Discharge: LEFT BEFORE DISCHARGE | End: 2023-07-03
Attending: EMERGENCY MEDICINE

## 2023-07-03 ENCOUNTER — APPOINTMENT (OUTPATIENT)
Dept: GENERAL RADIOLOGY | Age: 43
End: 2023-07-03

## 2023-07-03 VITALS
RESPIRATION RATE: 28 BRPM | SYSTOLIC BLOOD PRESSURE: 186 MMHG | HEART RATE: 78 BPM | OXYGEN SATURATION: 100 % | TEMPERATURE: 98.8 F | DIASTOLIC BLOOD PRESSURE: 126 MMHG

## 2023-07-03 DIAGNOSIS — R22.0 FACIAL SWELLING: ICD-10-CM

## 2023-07-03 DIAGNOSIS — T63.301D SPIDER BITE WOUND, ACCIDENTAL OR UNINTENTIONAL, SUBSEQUENT ENCOUNTER: ICD-10-CM

## 2023-07-03 DIAGNOSIS — I49.3 PVC'S (PREMATURE VENTRICULAR CONTRACTIONS): ICD-10-CM

## 2023-07-03 DIAGNOSIS — R07.9 CHEST PAIN, UNSPECIFIED TYPE: Primary | ICD-10-CM

## 2023-07-03 DIAGNOSIS — I10 UNCONTROLLED HYPERTENSION: ICD-10-CM

## 2023-07-03 LAB
ALBUMIN SERPL-MCNC: 3.7 G/DL (ref 3.5–5)
ALBUMIN/GLOB SERPL: 1 (ref 0.4–1.6)
ALP SERPL-CCNC: 54 U/L (ref 50–136)
ALT SERPL-CCNC: 14 U/L (ref 12–65)
ANION GAP SERPL CALC-SCNC: 2 MMOL/L (ref 2–11)
AST SERPL-CCNC: 14 U/L (ref 15–37)
BASOPHILS # BLD: 0.1 K/UL (ref 0–0.2)
BASOPHILS NFR BLD: 1 % (ref 0–2)
BILIRUB SERPL-MCNC: 0.9 MG/DL (ref 0.2–1.1)
BILIRUB UR QL: NEGATIVE
BUN SERPL-MCNC: 11 MG/DL (ref 6–23)
CALCIUM SERPL-MCNC: 8.9 MG/DL (ref 8.3–10.4)
CHLORIDE SERPL-SCNC: 109 MMOL/L (ref 101–110)
CO2 SERPL-SCNC: 27 MMOL/L (ref 21–32)
CREAT SERPL-MCNC: 1 MG/DL (ref 0.6–1)
DIFFERENTIAL METHOD BLD: ABNORMAL
EKG ATRIAL RATE: 87 BPM
EKG DIAGNOSIS: NORMAL
EKG P AXIS: 79 DEGREES
EKG P-R INTERVAL: 136 MS
EKG Q-T INTERVAL: 382 MS
EKG QRS DURATION: 76 MS
EKG QTC CALCULATION (BAZETT): 459 MS
EKG R AXIS: -70 DEGREES
EKG T AXIS: 74 DEGREES
EKG VENTRICULAR RATE: 87 BPM
EOSINOPHIL # BLD: 0.1 K/UL (ref 0–0.8)
EOSINOPHIL NFR BLD: 1 % (ref 0.5–7.8)
ERYTHROCYTE [DISTWIDTH] IN BLOOD BY AUTOMATED COUNT: 13.9 % (ref 11.9–14.6)
GLOBULIN SER CALC-MCNC: 3.6 G/DL (ref 2.8–4.5)
GLUCOSE SERPL-MCNC: 98 MG/DL (ref 65–100)
GLUCOSE UR QL STRIP.AUTO: NEGATIVE MG/DL
HCG SERPL QL: NEGATIVE
HCG UR QL: NEGATIVE
HCT VFR BLD AUTO: 45.9 % (ref 35.8–46.3)
HGB BLD-MCNC: 15.1 G/DL (ref 11.7–15.4)
IMM GRANULOCYTES # BLD AUTO: 0 K/UL (ref 0–0.5)
IMM GRANULOCYTES NFR BLD AUTO: 0 % (ref 0–5)
KETONES UR-MCNC: 15 MG/DL
LEUKOCYTE ESTERASE UR QL STRIP: NEGATIVE
LYMPHOCYTES # BLD: 1.7 K/UL (ref 0.5–4.6)
LYMPHOCYTES NFR BLD: 14 % (ref 13–44)
MCH RBC QN AUTO: 30.8 PG (ref 26.1–32.9)
MCHC RBC AUTO-ENTMCNC: 32.9 G/DL (ref 31.4–35)
MCV RBC AUTO: 93.7 FL (ref 82–102)
MONOCYTES # BLD: 0.6 K/UL (ref 0.1–1.3)
MONOCYTES NFR BLD: 5 % (ref 4–12)
NEUTS SEG # BLD: 9.3 K/UL (ref 1.7–8.2)
NEUTS SEG NFR BLD: 79 % (ref 43–78)
NITRITE UR QL: NEGATIVE
NRBC # BLD: 0 K/UL (ref 0–0.2)
PH UR: 7 (ref 5–9)
PLATELET # BLD AUTO: 221 K/UL (ref 150–450)
PMV BLD AUTO: 10.9 FL (ref 9.4–12.3)
POTASSIUM SERPL-SCNC: 4.2 MMOL/L (ref 3.5–5.1)
PROCALCITONIN SERPL-MCNC: <0.05 NG/ML (ref 0–0.49)
PROT SERPL-MCNC: 7.3 G/DL (ref 6.3–8.2)
PROT UR QL: NEGATIVE MG/DL
RBC # BLD AUTO: 4.9 M/UL (ref 4.05–5.2)
RBC # UR STRIP: NEGATIVE
SERVICE CMNT-IMP: ABNORMAL
SODIUM SERPL-SCNC: 138 MMOL/L (ref 133–143)
SP GR UR: 1.02 (ref 1–1.02)
TROPONIN I SERPL HS-MCNC: 3.4 PG/ML (ref 0–14)
TROPONIN I SERPL HS-MCNC: 6 PG/ML (ref 0–14)
UROBILINOGEN UR QL: 0.2 EU/DL (ref 0.2–1)
WBC # BLD AUTO: 11.8 K/UL (ref 4.3–11.1)

## 2023-07-03 PROCEDURE — 99285 EMERGENCY DEPT VISIT HI MDM: CPT

## 2023-07-03 PROCEDURE — 84703 CHORIONIC GONADOTROPIN ASSAY: CPT

## 2023-07-03 PROCEDURE — 2580000003 HC RX 258: Performed by: NURSE PRACTITIONER

## 2023-07-03 PROCEDURE — 81003 URINALYSIS AUTO W/O SCOPE: CPT

## 2023-07-03 PROCEDURE — 6360000002 HC RX W HCPCS: Performed by: NURSE PRACTITIONER

## 2023-07-03 PROCEDURE — 84145 PROCALCITONIN (PCT): CPT

## 2023-07-03 PROCEDURE — 84484 ASSAY OF TROPONIN QUANT: CPT

## 2023-07-03 PROCEDURE — 71046 X-RAY EXAM CHEST 2 VIEWS: CPT

## 2023-07-03 PROCEDURE — 80053 COMPREHEN METABOLIC PANEL: CPT

## 2023-07-03 PROCEDURE — 85025 COMPLETE CBC W/AUTO DIFF WBC: CPT

## 2023-07-03 PROCEDURE — 96374 THER/PROPH/DIAG INJ IV PUSH: CPT

## 2023-07-03 PROCEDURE — 81025 URINE PREGNANCY TEST: CPT

## 2023-07-03 RX ORDER — DEXAMETHASONE SODIUM PHOSPHATE 10 MG/ML
10 INJECTION INTRAMUSCULAR; INTRAVENOUS
Status: COMPLETED | OUTPATIENT
Start: 2023-07-03 | End: 2023-07-03

## 2023-07-03 RX ORDER — 0.9 % SODIUM CHLORIDE 0.9 %
1000 INTRAVENOUS SOLUTION INTRAVENOUS ONCE
Status: COMPLETED | OUTPATIENT
Start: 2023-07-03 | End: 2023-07-03

## 2023-07-03 RX ORDER — DIPHENHYDRAMINE HYDROCHLORIDE 50 MG/ML
50 INJECTION INTRAMUSCULAR; INTRAVENOUS
Status: COMPLETED | OUTPATIENT
Start: 2023-07-03 | End: 2023-07-03

## 2023-07-03 RX ADMIN — SODIUM CHLORIDE 1000 ML: 9 INJECTION, SOLUTION INTRAVENOUS at 14:02

## 2023-07-03 RX ADMIN — DEXAMETHASONE SODIUM PHOSPHATE 10 MG: 10 INJECTION INTRAMUSCULAR; INTRAVENOUS at 14:04

## 2023-07-03 RX ADMIN — DIPHENHYDRAMINE HYDROCHLORIDE 50 MG: 50 INJECTION, SOLUTION INTRAMUSCULAR; INTRAVENOUS at 14:04

## 2023-07-03 NOTE — ED TRIAGE NOTES
Pt arrives via pov ambulatory c/o redness and swelling to the face after a spider bite on Friday. Seen here for it on Friday. Did not fill prescriptions. Pt reports nauseated. Pt reports SOB. Presents with even and unlabored respirations.

## 2023-07-03 NOTE — ED PROVIDER NOTES
Q-T Interval 382 ms    QTc Calculation (Bazett) 459 ms    P Axis 79 degrees    R Axis -70 degrees    T Axis 74 degrees    Diagnosis       Sinus rhythm with sinus arrhythmia with frequent and consecutive Premature   ventricular complexes  Left axis deviation  Abnormal ECG  When compared with ECG of 06-APR-2022 15:43,  Premature ventricular complexes are now Present  Confirmed by MD ROLY, Lane Forrester ([id number]) on 7/3/2023 3:33:23 PM          XR CHEST (2 VW)   Final Result   No consolidation. Voice dictation software was used during the making of this note. This software is not perfect and grammatical and other typographical errors may be present. This note has not been completely proofread for errors.      Yaquelin Reyna, GILBERTO - CNP  07/03/23 2046

## 2023-07-03 NOTE — ED NOTES
Pt departed prior to receiving discharge papers. Sarai Woo., NP aware.       Forrest Meyer RN  07/03/23 1734

## 2023-11-02 ENCOUNTER — HOSPITAL ENCOUNTER (EMERGENCY)
Age: 43
Discharge: HOME | End: 2023-11-02
Attending: EMERGENCY MEDICINE

## 2023-11-02 VITALS
DIASTOLIC BLOOD PRESSURE: 107 MMHG | OXYGEN SATURATION: 98 % | WEIGHT: 200 LBS | TEMPERATURE: 98.8 F | RESPIRATION RATE: 17 BRPM | HEART RATE: 102 BPM | BODY MASS INDEX: 31.39 KG/M2 | HEIGHT: 67 IN | SYSTOLIC BLOOD PRESSURE: 141 MMHG

## 2023-11-02 DIAGNOSIS — R52 BODY ACHES: Primary | ICD-10-CM

## 2023-11-02 DIAGNOSIS — B34.9 VIRAL SYNDROME: ICD-10-CM

## 2023-11-02 LAB
FLUAV RNA SPEC QL NAA+PROBE: NOT DETECTED
FLUBV RNA SPEC QL NAA+PROBE: NOT DETECTED
SARS-COV-2 RDRP RESP QL NAA+PROBE: NOT DETECTED
SOURCE: NORMAL

## 2023-11-02 PROCEDURE — 87502 INFLUENZA DNA AMP PROBE: CPT

## 2023-11-02 PROCEDURE — 99283 EMERGENCY DEPT VISIT LOW MDM: CPT

## 2023-11-02 PROCEDURE — 87635 SARS-COV-2 COVID-19 AMP PRB: CPT

## 2023-11-02 ASSESSMENT — PAIN - FUNCTIONAL ASSESSMENT: PAIN_FUNCTIONAL_ASSESSMENT: 0-10

## 2023-11-02 ASSESSMENT — PAIN DESCRIPTION - FREQUENCY: FREQUENCY: CONTINUOUS

## 2023-11-02 ASSESSMENT — PAIN DESCRIPTION - DESCRIPTORS: DESCRIPTORS: ACHING;DISCOMFORT

## 2023-11-02 ASSESSMENT — PAIN DESCRIPTION - PAIN TYPE: TYPE: ACUTE PAIN

## 2023-11-02 ASSESSMENT — LIFESTYLE VARIABLES
HOW OFTEN DO YOU HAVE A DRINK CONTAINING ALCOHOL: MONTHLY OR LESS
HOW MANY STANDARD DRINKS CONTAINING ALCOHOL DO YOU HAVE ON A TYPICAL DAY: 1 OR 2

## 2023-11-02 ASSESSMENT — PAIN SCALES - GENERAL: PAINLEVEL_OUTOF10: 6

## 2023-11-02 ASSESSMENT — PAIN DESCRIPTION - LOCATION: LOCATION: GENERALIZED

## 2023-11-02 NOTE — ED TRIAGE NOTES
Pt reports while at work Wednesday started to have dry cough now with generalized body aches, emesis just yesterday with coughing, decreased appetite  (-)fever, dyspnea  A&Ox4

## 2023-11-02 NOTE — DISCHARGE INSTRUCTIONS
Treat symptoms with over-the-counter medication  Make certain you are taking in plenty of fluids  Return with any worsening significant mood changes or concerns

## 2023-11-09 ASSESSMENT — ENCOUNTER SYMPTOMS
COUGH: 1
VOICE CHANGE: 0
GASTROINTESTINAL NEGATIVE: 1
SORE THROAT: 1
TROUBLE SWALLOWING: 0

## 2023-11-09 NOTE — ED PROVIDER NOTES
Emergency Department Provider Note       PCP: None, None   Age: 41 y.o. Sex: female     DISPOSITION Decision To Discharge 11/02/2023 10:28:24 AM       ICD-10-CM    1. Body aches  R52       2. Viral syndrome  B34.9           Medical Decision Making     Complexity of Problems Addressed:  1 acute upper respiratory infection type illness    Data Reviewed and Analyzed:  I independently ordered and reviewed each unique test.         I interpreted the COVID and influenza tests are negative. Discussion of management or test interpretation. Stable patient with normal exam in general consistent with upper respiratory infection that is negative on our testing for COVID or influenza      Risk of Complications and/or Morbidity of Patient Management: To recheck with any new or worsening problems         History       Patient is here with a dry cough dull headache and some possible chills since day prior. Slight sore throat during this time as well. No infected sputum. No purulent nasal drainage. No GI symptoms such as nausea vomiting diarrhea or change in stool. No urinary symptoms. Does have some generalized body aches. Baseline fairly healthy. The history is provided by the patient. Generalized Body Aches  Onset quality:  Gradual  Duration:  1 day  Timing:  Constant  Progression:  Unchanged  Associated symptoms: cough and sore throat    Associated symptoms: no fever    Cough  Associated symptoms: chills and sore throat    Associated symptoms: no fever         Review of Systems   Constitutional:  Positive for chills. Negative for fever. HENT:  Positive for sore throat. Negative for trouble swallowing and voice change. Respiratory:  Positive for cough. Cardiovascular: Negative. Gastrointestinal: Negative. Musculoskeletal: Negative. Neurological: Negative. Psychiatric/Behavioral: Negative.          Physical Exam     Vitals signs and nursing note reviewed:  Vitals:    11/02/23 0825 11/02/23

## 2024-04-18 ENCOUNTER — APPOINTMENT (OUTPATIENT)
Dept: CT IMAGING | Age: 44
End: 2024-04-18
Payer: OTHER MISCELLANEOUS

## 2024-04-18 ENCOUNTER — HOSPITAL ENCOUNTER (EMERGENCY)
Age: 44
Discharge: HOME | End: 2024-04-18
Payer: OTHER MISCELLANEOUS

## 2024-04-18 VITALS
TEMPERATURE: 98.1 F | WEIGHT: 205 LBS | OXYGEN SATURATION: 100 % | RESPIRATION RATE: 16 BRPM | HEIGHT: 67 IN | DIASTOLIC BLOOD PRESSURE: 102 MMHG | SYSTOLIC BLOOD PRESSURE: 155 MMHG | BODY MASS INDEX: 32.18 KG/M2 | HEART RATE: 77 BPM

## 2024-04-18 DIAGNOSIS — M54.50 ACUTE MIDLINE LOW BACK PAIN WITHOUT SCIATICA: ICD-10-CM

## 2024-04-18 DIAGNOSIS — R10.819 ABDOMINAL TENDERNESS WITHOUT REBOUND TENDERNESS, UNSPECIFIED LOCATION: ICD-10-CM

## 2024-04-18 DIAGNOSIS — M54.2 NECK PAIN: ICD-10-CM

## 2024-04-18 DIAGNOSIS — M54.9 UPPER BACK PAIN: ICD-10-CM

## 2024-04-18 DIAGNOSIS — S09.90XA INJURY OF HEAD, INITIAL ENCOUNTER: ICD-10-CM

## 2024-04-18 DIAGNOSIS — R07.9 CHEST PAIN, UNSPECIFIED TYPE: ICD-10-CM
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
## 2024-04-18 DIAGNOSIS — R03.0 ELEVATED BLOOD PRESSURE READING: ICD-10-CM

## 2024-04-18 LAB
ALBUMIN SERPL-MCNC: 3.7 G/DL (ref 3.5–5)
ALBUMIN/GLOB SERPL: 1 (ref 0.4–1.6)
ALP SERPL-CCNC: 51 U/L (ref 50–136)
ALT SERPL-CCNC: 21 U/L (ref 12–65)
ANION GAP SERPL CALC-SCNC: 6 MMOL/L (ref 2–11)
AST SERPL-CCNC: 25 U/L (ref 15–37)
BASOPHILS # BLD: 0.1 K/UL (ref 0–0.2)
BASOPHILS NFR BLD: 1 % (ref 0–2)
BILIRUB SERPL-MCNC: 0.5 MG/DL (ref 0.2–1.1)
BILIRUB UR QL: ABNORMAL
BUN SERPL-MCNC: 14 MG/DL (ref 6–23)
CALCIUM SERPL-MCNC: 8.6 MG/DL (ref 8.3–10.4)
CHLORIDE SERPL-SCNC: 110 MMOL/L (ref 103–113)
CO2 SERPL-SCNC: 24 MMOL/L (ref 21–32)
CREAT SERPL-MCNC: 0.9 MG/DL (ref 0.6–1)
DIFFERENTIAL METHOD BLD: NORMAL
EOSINOPHIL # BLD: 0.1 K/UL (ref 0–0.8)
EOSINOPHIL NFR BLD: 1 % (ref 0.5–7.8)
ERYTHROCYTE [DISTWIDTH] IN BLOOD BY AUTOMATED COUNT: 13.2 % (ref 11.9–14.6)
GLOBULIN SER CALC-MCNC: 3.8 G/DL (ref 2.8–4.5)
GLUCOSE SERPL-MCNC: 87 MG/DL (ref 65–100)
GLUCOSE UR QL STRIP.AUTO: NEGATIVE MG/DL
HCG UR QL: NEGATIVE
HCT VFR BLD AUTO: 43.9 % (ref 35.8–46.3)
HGB BLD-MCNC: 14.4 G/DL (ref 11.7–15.4)
IMM GRANULOCYTES # BLD AUTO: 0 K/UL (ref 0–0.5)
IMM GRANULOCYTES NFR BLD AUTO: 0 % (ref 0–5)
KETONES UR-MCNC: 15 MG/DL
LEUKOCYTE ESTERASE UR QL STRIP: NEGATIVE
LYMPHOCYTES # BLD: 2.8 K/UL (ref 0.5–4.6)
LYMPHOCYTES NFR BLD: 30 % (ref 13–44)
MCH RBC QN AUTO: 30.3 PG (ref 26.1–32.9)
MCHC RBC AUTO-ENTMCNC: 32.8 G/DL (ref 31.4–35)
MCV RBC AUTO: 92.2 FL (ref 82–102)
MONOCYTES # BLD: 0.8 K/UL (ref 0.1–1.3)
MONOCYTES NFR BLD: 8 % (ref 4–12)
NEUTS SEG # BLD: 5.7 K/UL (ref 1.7–8.2)
NEUTS SEG NFR BLD: 60 % (ref 43–78)
NITRITE UR QL: NEGATIVE
NRBC # BLD: 0 K/UL (ref 0–0.2)
PH UR: 5.5 (ref 5–9)
PLATELET # BLD AUTO: 214 K/UL (ref 150–450)
PMV BLD AUTO: 10.9 FL (ref 9.4–12.3)
POTASSIUM SERPL-SCNC: 4 MMOL/L (ref 3.5–5.1)
PROT SERPL-MCNC: 7.5 G/DL (ref 6.3–8.2)
PROT UR QL: 30 MG/DL
RBC # BLD AUTO: 4.76 M/UL (ref 4.05–5.2)
RBC # UR STRIP: ABNORMAL
SERVICE CMNT-IMP: ABNORMAL
SODIUM SERPL-SCNC: 140 MMOL/L (ref 136–146)
SP GR UR: >1.03 (ref 1–1.02)
TROPONIN I SERPL HS-MCNC: 6.4 PG/ML (ref 0–14)
UROBILINOGEN UR QL: 1 EU/DL (ref 0.2–1)
WBC # BLD AUTO: 9.5 K/UL (ref 4.3–11.1)

## 2024-04-18 PROCEDURE — 96374 THER/PROPH/DIAG INJ IV PUSH: CPT

## 2024-04-18 PROCEDURE — 96375 TX/PRO/DX INJ NEW DRUG ADDON: CPT

## 2024-04-18 PROCEDURE — 6360000004 HC RX CONTRAST MEDICATION: Performed by: NURSE PRACTITIONER

## 2024-04-18 PROCEDURE — 70450 CT HEAD/BRAIN W/O DYE: CPT

## 2024-04-18 PROCEDURE — 85025 COMPLETE CBC W/AUTO DIFF WBC: CPT

## 2024-04-18 PROCEDURE — 80053 COMPREHEN METABOLIC PANEL: CPT

## 2024-04-18 PROCEDURE — 72125 CT NECK SPINE W/O DYE: CPT

## 2024-04-18 PROCEDURE — 71260 CT THORAX DX C+: CPT

## 2024-04-18 PROCEDURE — 93005 ELECTROCARDIOGRAM TRACING: CPT | Performed by: NURSE PRACTITIONER

## 2024-04-18 PROCEDURE — 99285 EMERGENCY DEPT VISIT HI MDM: CPT

## 2024-04-18 PROCEDURE — 84484 ASSAY OF TROPONIN QUANT: CPT

## 2024-04-18 PROCEDURE — 81025 URINE PREGNANCY TEST: CPT

## 2024-04-18 PROCEDURE — 6370000000 HC RX 637 (ALT 250 FOR IP): Performed by: NURSE PRACTITIONER

## 2024-04-18 PROCEDURE — 6360000002 HC RX W HCPCS: Performed by: NURSE PRACTITIONER

## 2024-04-18 PROCEDURE — 2580000003 HC RX 258: Performed by: NURSE PRACTITIONER

## 2024-04-18 PROCEDURE — 81003 URINALYSIS AUTO W/O SCOPE: CPT

## 2024-04-18 RX ORDER — 0.9 % SODIUM CHLORIDE 0.9 %
1000 INTRAVENOUS SOLUTION INTRAVENOUS ONCE
Status: COMPLETED | OUTPATIENT
Start: 2024-04-18 | End: 2024-04-18

## 2024-04-18 RX ORDER — METOCLOPRAMIDE HYDROCHLORIDE 5 MG/ML
10 INJECTION INTRAMUSCULAR; INTRAVENOUS
Status: COMPLETED | OUTPATIENT
Start: 2024-04-18 | End: 2024-04-18

## 2024-04-18 RX ORDER — CYCLOBENZAPRINE HCL 5 MG
5 TABLET ORAL 2 TIMES DAILY PRN
Qty: 8 TABLET | Refills: 0 | Status: SHIPPED | OUTPATIENT
Start: 2024-04-18 | End: 2024-04-22

## 2024-04-18 RX ORDER — ACETAMINOPHEN 500 MG
1000 TABLET ORAL
Status: COMPLETED | OUTPATIENT
Start: 2024-04-18 | End: 2024-04-18

## 2024-04-18 RX ORDER — IBUPROFEN 600 MG/1
600 TABLET ORAL EVERY 8 HOURS PRN
Qty: 12 TABLET | Refills: 0 | Status: SHIPPED | OUTPATIENT
Start: 2024-04-18 | End: 2024-04-22

## 2024-04-18 RX ORDER — DIPHENHYDRAMINE HYDROCHLORIDE 50 MG/ML
25 INJECTION INTRAMUSCULAR; INTRAVENOUS
Status: COMPLETED | OUTPATIENT
Start: 2024-04-18 | End: 2024-04-18

## 2024-04-18 RX ORDER — DEXAMETHASONE SODIUM PHOSPHATE 10 MG/ML
10 INJECTION INTRAMUSCULAR; INTRAVENOUS
Status: COMPLETED | OUTPATIENT
Start: 2024-04-18 | End: 2024-04-18

## 2024-04-18 RX ADMIN — IOPAMIDOL 100 ML: 755 INJECTION, SOLUTION INTRAVENOUS at 18:33

## 2024-04-18 RX ADMIN — DEXAMETHASONE SODIUM PHOSPHATE 10 MG: 10 INJECTION INTRAMUSCULAR; INTRAVENOUS at 20:16

## 2024-04-18 RX ADMIN — ACETAMINOPHEN 1000 MG: 500 TABLET ORAL at 19:12

## 2024-04-18 RX ADMIN — DIPHENHYDRAMINE HYDROCHLORIDE 25 MG: 50 INJECTION INTRAMUSCULAR; INTRAVENOUS at 20:16

## 2024-04-18 RX ADMIN — SODIUM CHLORIDE 1000 ML: 9 INJECTION, SOLUTION INTRAVENOUS at 19:12

## 2024-04-18 RX ADMIN — METOCLOPRAMIDE HYDROCHLORIDE 10 MG: 5 INJECTION INTRAMUSCULAR; INTRAVENOUS at 20:16

## 2024-04-18 ASSESSMENT — PAIN - FUNCTIONAL ASSESSMENT: PAIN_FUNCTIONAL_ASSESSMENT: 0-10

## 2024-04-18 ASSESSMENT — PAIN SCALES - GENERAL: PAINLEVEL_OUTOF10: 3
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
## 2024-04-19 LAB
EKG ATRIAL RATE: 90 BPM
EKG DIAGNOSIS: NORMAL
EKG P AXIS: 82 DEGREES
EKG P-R INTERVAL: 132 MS
EKG Q-T INTERVAL: 360 MS
EKG QRS DURATION: 84 MS
EKG QTC CALCULATION (BAZETT): 440 MS
EKG R AXIS: -65 DEGREES
EKG T AXIS: 65 DEGREES
EKG VENTRICULAR RATE: 90 BPM

## 2024-04-19 PROCEDURE — 93010 ELECTROCARDIOGRAM REPORT: CPT | Performed by: INTERNAL MEDICINE

## 2024-04-19 NOTE — ED NOTES
Patient mobility status  with no difficulty. Provider aware     I have reviewed discharge instructions with the patient.  The patient verbalized understanding.    Patient left ED via Discharge Method: ambulatory to Home with Extended Family:.    Opportunity for questions and clarification provided.     Patient given 2 scripts.           Mery Wisdom, RN  04/18/24 1881

## 2024-04-19 NOTE — DISCHARGE INSTR - COC
Continuity of Care Form    Patient Name: David Pederson   :  1980  MRN:  [id number]    Admit date:  2024  Discharge date:  ***    Code Status Order: No Order   Advance Directives:     Admitting Physician:  No admitting provider for patient encounter.  PCP: None, None    Discharging Nurse: ***  Discharging Hospital Unit/Room#: ER22/22  Discharging Unit Phone Number: ***    Emergency Contact:   Extended Emergency Contact Information  Primary Emergency Contact: Nellie Pederson  Address: [address] [place] [address] [address] [place] [address]  Home Phone: [phone]  Mobile Phone: [phone]  Relation: Parent   needed? No    Past Surgical History:  Past Surgical History:   Procedure Laterality Date    BREAST BIOPSY Left 2022    GYN      tubal preg--left ovary    ORTHOPEDIC SURGERY      left side orbital fx and nose surgery in Dec    SALPINGECTOMY Left     US BREAST BIOPSY W LOC DEVICE 1ST LESION LEFT Left 2022    US BREAST NEEDLE BIOPSY LEFT 2022 [facility]       Immunization History:   Immunization History   Administered Date(s) Administered    TDaP, ADACEL (age 10y-64y), BOOSTRIX (age 10y+), IM, 0.5mL 2023       Active Problems:  Patient Active Problem List   Diagnosis Code    Breast infection N61.0    Hypertensive disorder I10    Mass of lower inner quadrant of left breast N63.24    Tobacco user Z72.0       Isolation/Infection:   Isolation            No Isolation          Patient Infection Status       None to display                     Nurse Assessment:  Last Vital Signs: BP (!) 155/102   Pulse 80   Temp 98.1 °F (36.7 °C) (Oral)   Resp 16   Ht 1.702 m (5' 7\")   Wt 93 kg (205 lb)   SpO2 100%   BMI 32.11 kg/m²     Last documented pain score (0-10 scale):    Last Weight:   Wt Readings from Last 1 Encounters:   24 93 kg (205 lb)     Mental Status:  {IP PT MENTAL STATUS:}    IV Access:  { JUAN IV

## 2024-05-06 ENCOUNTER — HOSPITAL ENCOUNTER (EMERGENCY)
Age: 44
Discharge: HOME | End: 2024-05-06
Payer: OTHER MISCELLANEOUS

## 2024-05-06 VITALS
OXYGEN SATURATION: 99 % | DIASTOLIC BLOOD PRESSURE: 114 MMHG | TEMPERATURE: 98.6 F | SYSTOLIC BLOOD PRESSURE: 165 MMHG | HEART RATE: 85 BPM

## 2024-05-06 DIAGNOSIS — Z02.89 ENCOUNTER TO OBTAIN EXCUSE FROM WORK: Primary | ICD-10-CM

## 2024-05-06 PROCEDURE — 99282 EMERGENCY DEPT VISIT SF MDM: CPT

## 2024-05-06 NOTE — ED PROVIDER NOTES
status: Every Day     Current packs/day: 0.50     Types: Cigarettes    Smokeless tobacco: Never   Substance and Sexual Activity    Alcohol use: Yes    Drug use: No        Discharge Medication List as of 5/6/2024 12:06 PM        CONTINUE these medications which have NOT CHANGED    Details   ibuprofen (ADVIL;MOTRIN) 600 MG tablet Take 1 tablet by mouth every 8 hours as needed for Pain, Disp-12 tablet, R-0Print      amLODIPine (NORVASC) 5 MG tablet Take 5 mg by mouth dailyHistorical Med      metaxalone (SKELAXIN) 800 MG tablet Take 800 mg by mouth 3 times dailyHistorical Med      ondansetron (ZOFRAN) 4 MG tablet Take 4 mg by mouth every 8 hours as neededHistorical Med              No results found for any visits on 05/06/24.      No orders to display                No results for input(s): \"COVID19\" in the last 72 hours.     Voice dictation software was used during the making of this note.  This software is not perfect and grammatical and other typographical errors may be present.  This note has not been completely proofread for errors.     Levi Rivers, PA  05/06/24 3697
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
## 2024-05-06 NOTE — DISCHARGE INSTRUCTIONS
As discussed, unfortunately it is outside of our abilities here in the emergency department as well as urgent care to write for you to to be cleared to return to work.  Contact the phone number below to get you close follow-up with primary care provider.  Return as needed    We would love to help you get a primary care doctor for follow-up after your emergency department visit.    Please call [phone] between 7AM - 6PM Monday to Friday.  A care navigator will be able to assist you with setting up a doctor close to your home.

## 2025-08-08 ENCOUNTER — APPOINTMENT (OUTPATIENT)
Dept: GENERAL RADIOLOGY | Age: 45
End: 2025-08-08
Payer: COMMERCIAL

## 2025-08-08 ENCOUNTER — HOSPITAL ENCOUNTER (EMERGENCY)
Age: 45
Discharge: HOME | End: 2025-08-08
Payer: COMMERCIAL

## 2025-08-08 VITALS
HEART RATE: 88 BPM | BODY MASS INDEX: 31.39 KG/M2 | HEIGHT: 67 IN | TEMPERATURE: 98.3 F | OXYGEN SATURATION: 99 % | RESPIRATION RATE: 18 BRPM | SYSTOLIC BLOOD PRESSURE: 192 MMHG | WEIGHT: 200 LBS | DIASTOLIC BLOOD PRESSURE: 114 MMHG

## 2025-08-08 DIAGNOSIS — S93.601A SPRAIN OF RIGHT FOOT, INITIAL ENCOUNTER: Primary | ICD-10-CM

## 2025-08-08 DIAGNOSIS — S93.501A SPRAIN OF RIGHT GREAT TOE, INITIAL ENCOUNTER: ICD-10-CM

## 2025-08-08 PROCEDURE — 99283 EMERGENCY DEPT VISIT LOW MDM: CPT

## 2025-08-08 PROCEDURE — 6370000000 HC RX 637 (ALT 250 FOR IP): Performed by: NURSE PRACTITIONER

## 2025-08-08 PROCEDURE — 73630 X-RAY EXAM OF FOOT: CPT

## 2025-08-08 RX ORDER — IBUPROFEN 600 MG/1
600 TABLET, FILM COATED ORAL 4 TIMES DAILY PRN
Qty: 40 TABLET | Refills: 0 | Status: SHIPPED | OUTPATIENT
Start: 2025-08-08

## 2025-08-08 RX ORDER — HYDROCODONE BITARTRATE AND ACETAMINOPHEN 5; 325 MG/1; MG/1
1 TABLET ORAL
Refills: 0 | Status: COMPLETED | OUTPATIENT
Start: 2025-08-08 | End: 2025-08-08

## 2025-08-08 RX ADMIN — HYDROCODONE BITARTRATE AND ACETAMINOPHEN 1 TABLET: 5; 325 TABLET ORAL at 20:13

## 2025-08-08 ASSESSMENT — PAIN DESCRIPTION - ORIENTATION: ORIENTATION: RIGHT

## 2025-08-08 ASSESSMENT — PAIN DESCRIPTION - LOCATION: LOCATION: TOE (COMMENT WHICH ONE)

## 2025-08-08 ASSESSMENT — PAIN - FUNCTIONAL ASSESSMENT: PAIN_FUNCTIONAL_ASSESSMENT: 0-10

## 2025-08-08 ASSESSMENT — LIFESTYLE VARIABLES
HOW OFTEN DO YOU HAVE A DRINK CONTAINING ALCOHOL: 2-3 TIMES A WEEK
HOW MANY STANDARD DRINKS CONTAINING ALCOHOL DO YOU HAVE ON A TYPICAL DAY: 3 OR 4

## 2025-08-08 ASSESSMENT — PAIN SCALES - GENERAL
PAINLEVEL_OUTOF10: 9
PAINLEVEL_OUTOF10: 9